# Patient Record
Sex: MALE | Race: WHITE | NOT HISPANIC OR LATINO | Employment: OTHER | URBAN - METROPOLITAN AREA
[De-identification: names, ages, dates, MRNs, and addresses within clinical notes are randomized per-mention and may not be internally consistent; named-entity substitution may affect disease eponyms.]

---

## 2023-10-27 DIAGNOSIS — I10 PRIMARY HYPERTENSION: ICD-10-CM

## 2023-10-27 RX ORDER — LOSARTAN POTASSIUM 100 MG/1
100 TABLET ORAL DAILY
COMMUNITY
Start: 2023-07-11 | End: 2023-10-30 | Stop reason: SDUPTHER

## 2023-10-30 RX ORDER — LOSARTAN POTASSIUM 100 MG/1
100 TABLET ORAL DAILY
Qty: 90 TABLET | Refills: 0 | Status: SHIPPED | OUTPATIENT
Start: 2023-10-30 | End: 2024-02-02

## 2023-11-05 PROBLEM — R00.1 SYMPTOMATIC SINUS BRADYCARDIA: Status: ACTIVE | Noted: 2023-11-05

## 2023-11-05 PROBLEM — I44.2 COMPLETE ATRIOVENTRICULAR BLOCK (MULTI): Status: ACTIVE | Noted: 2023-11-05

## 2023-11-05 PROBLEM — R91.1 LUNG NODULE: Status: ACTIVE | Noted: 2023-11-05

## 2023-11-05 PROBLEM — R03.0 ELEVATED BLOOD PRESSURE READING WITHOUT DIAGNOSIS OF HYPERTENSION: Status: ACTIVE | Noted: 2023-11-05

## 2023-11-05 PROBLEM — R00.2 PALPITATIONS: Status: ACTIVE | Noted: 2023-11-05

## 2023-11-05 PROBLEM — E78.00 HYPERCHOLESTEREMIA: Status: ACTIVE | Noted: 2019-05-14

## 2023-11-05 PROBLEM — F17.200 SMOKER: Status: ACTIVE | Noted: 2019-05-14

## 2023-11-05 PROBLEM — B18.8: Status: ACTIVE | Noted: 2021-08-04

## 2023-11-05 PROBLEM — U07.1 COVID-19: Status: ACTIVE | Noted: 2022-10-25

## 2023-11-05 PROBLEM — I71.40 AAA (ABDOMINAL AORTIC ANEURYSM) (CMS-HCC): Status: ACTIVE | Noted: 2023-11-05

## 2023-11-05 PROBLEM — I10 BENIGN ESSENTIAL HTN: Status: ACTIVE | Noted: 2022-06-24

## 2023-11-05 PROBLEM — F17.210 NICOTINE DEPENDENCE, CIGARETTES, UNCOMPLICATED: Status: ACTIVE | Noted: 2023-02-08

## 2023-11-05 PROBLEM — R51.9 HEADACHE: Status: ACTIVE | Noted: 2023-11-05

## 2023-11-05 PROBLEM — I10 HYPERTENSION: Status: ACTIVE | Noted: 2022-09-19

## 2023-11-05 PROBLEM — I25.10 MILD CAD: Status: ACTIVE | Noted: 2019-05-14

## 2023-11-05 PROBLEM — E78.2 MIXED HYPERLIPIDEMIA: Status: ACTIVE | Noted: 2021-08-04

## 2023-11-05 PROBLEM — R06.02 SHORTNESS OF BREATH: Status: ACTIVE | Noted: 2023-11-05

## 2023-11-05 PROBLEM — R73.01 ABNORMAL FASTING GLUCOSE: Status: ACTIVE | Noted: 2019-05-14

## 2023-11-05 PROBLEM — R09.89 CAROTID BRUIT: Status: ACTIVE | Noted: 2023-11-05

## 2023-11-05 RX ORDER — IBUPROFEN 200 MG
1 TABLET ORAL EVERY 24 HOURS
COMMUNITY
End: 2023-11-07 | Stop reason: ALTCHOICE

## 2023-11-05 RX ORDER — PRAVASTATIN SODIUM 40 MG/1
40 TABLET ORAL DAILY
COMMUNITY
End: 2023-11-07 | Stop reason: SDUPTHER

## 2023-11-05 RX ORDER — CHOLECALCIFEROL (VITAMIN D3) 25 MCG
1000 TABLET ORAL DAILY
COMMUNITY
End: 2023-11-07 | Stop reason: ALTCHOICE

## 2023-11-05 RX ORDER — LATANOPROST 50 UG/ML
1 SOLUTION/ DROPS OPHTHALMIC NIGHTLY
COMMUNITY

## 2023-11-05 RX ORDER — IBUPROFEN 400 MG/1
400 TABLET ORAL EVERY 6 HOURS PRN
COMMUNITY
End: 2023-11-07 | Stop reason: ALTCHOICE

## 2023-11-05 RX ORDER — ACETAMINOPHEN 325 MG/1
650 TABLET ORAL EVERY 6 HOURS PRN
COMMUNITY
End: 2023-11-07 | Stop reason: ALTCHOICE

## 2023-11-05 RX ORDER — TIMOLOL MALEATE 5 MG/ML
1 SOLUTION/ DROPS OPHTHALMIC DAILY
COMMUNITY

## 2023-11-05 RX ORDER — ASPIRIN 81 MG/1
81 TABLET ORAL DAILY
COMMUNITY

## 2023-11-07 ENCOUNTER — OFFICE VISIT (OUTPATIENT)
Dept: PRIMARY CARE | Facility: CLINIC | Age: 68
End: 2023-11-07
Payer: COMMERCIAL

## 2023-11-07 ENCOUNTER — TELEPHONE (OUTPATIENT)
Dept: PRIMARY CARE | Facility: CLINIC | Age: 68
End: 2023-11-07

## 2023-11-07 VITALS
WEIGHT: 169 LBS | TEMPERATURE: 97.3 F | DIASTOLIC BLOOD PRESSURE: 84 MMHG | BODY MASS INDEX: 26.53 KG/M2 | HEIGHT: 67 IN | OXYGEN SATURATION: 99 % | HEART RATE: 73 BPM | SYSTOLIC BLOOD PRESSURE: 136 MMHG

## 2023-11-07 DIAGNOSIS — Z00.00 WELL ADULT EXAM: ICD-10-CM

## 2023-11-07 DIAGNOSIS — I10 BENIGN ESSENTIAL HTN: Primary | ICD-10-CM

## 2023-11-07 DIAGNOSIS — E78.00 HYPERCHOLESTEREMIA: ICD-10-CM

## 2023-11-07 DIAGNOSIS — F17.210 NICOTINE DEPENDENCE, CIGARETTES, UNCOMPLICATED: ICD-10-CM

## 2023-11-07 DIAGNOSIS — E78.2 MIXED HYPERLIPIDEMIA: ICD-10-CM

## 2023-11-07 DIAGNOSIS — R01.1 SYSTOLIC MURMUR: ICD-10-CM

## 2023-11-07 DIAGNOSIS — R01.2 ABNORMAL CARDIAC SOUNDS: ICD-10-CM

## 2023-11-07 DIAGNOSIS — I10 HYPERTENSION, UNSPECIFIED TYPE: ICD-10-CM

## 2023-11-07 DIAGNOSIS — E78.5 HYPERLIPIDEMIA, UNSPECIFIED HYPERLIPIDEMIA TYPE: ICD-10-CM

## 2023-11-07 PROCEDURE — 3075F SYST BP GE 130 - 139MM HG: CPT

## 2023-11-07 PROCEDURE — 1160F RVW MEDS BY RX/DR IN RCRD: CPT

## 2023-11-07 PROCEDURE — 99214 OFFICE O/P EST MOD 30 MIN: CPT

## 2023-11-07 PROCEDURE — 1126F AMNT PAIN NOTED NONE PRSNT: CPT

## 2023-11-07 PROCEDURE — 1159F MED LIST DOCD IN RCRD: CPT

## 2023-11-07 PROCEDURE — 93000 ELECTROCARDIOGRAM COMPLETE: CPT

## 2023-11-07 PROCEDURE — 3079F DIAST BP 80-89 MM HG: CPT

## 2023-11-07 RX ORDER — PRAVASTATIN SODIUM 40 MG/1
40 TABLET ORAL DAILY
Qty: 90 TABLET | Refills: 0 | Status: SHIPPED | OUTPATIENT
Start: 2023-11-07 | End: 2024-03-11

## 2023-11-07 ASSESSMENT — ENCOUNTER SYMPTOMS
SHORTNESS OF BREATH: 0
ORTHOPNEA: 0
FATIGUE: 0
GASTROINTESTINAL NEGATIVE: 1
NECK PAIN: 0
APPETITE CHANGE: 0
PND: 0
BLURRED VISION: 0
COUGH: 0
ACTIVITY CHANGE: 0
HYPERTENSION: 1
DIZZINESS: 0
SWEATS: 0
HEADACHES: 0
PALPITATIONS: 0
LIGHT-HEADEDNESS: 0
EYES NEGATIVE: 1
CHEST TIGHTNESS: 0
UNEXPECTED WEIGHT CHANGE: 0

## 2023-11-07 ASSESSMENT — PATIENT HEALTH QUESTIONNAIRE - PHQ9
SUM OF ALL RESPONSES TO PHQ9 QUESTIONS 1 AND 2: 0
1. LITTLE INTEREST OR PLEASURE IN DOING THINGS: NOT AT ALL
2. FEELING DOWN, DEPRESSED OR HOPELESS: NOT AT ALL

## 2023-11-07 ASSESSMENT — VISUAL ACUITY: OU: 1

## 2023-11-07 ASSESSMENT — PAIN SCALES - GENERAL: PAINLEVEL: 0-NO PAIN

## 2023-11-07 NOTE — PROGRESS NOTES
"Subjective   Patient ID: Porfirio Menendez is a 68 y.o. male who presents for Hypertension.    Hypertension  This is a chronic problem. The problem is controlled. Pertinent negatives include no anxiety, blurred vision, chest pain, headaches, malaise/fatigue, neck pain, orthopnea, palpitations, peripheral edema, PND, shortness of breath or sweats. There are no associated agents to hypertension. Risk factors for coronary artery disease include male gender, smoking/tobacco exposure and dyslipidemia. Treatments tried: Currently taking losartan 100 mg daily. There are no compliance problems.  There is no history of angina, kidney disease, CAD/MI, CVA, heart failure, left ventricular hypertrophy, PVD or retinopathy.        Review of Systems   Constitutional:  Negative for activity change, appetite change, fatigue, malaise/fatigue and unexpected weight change.   HENT: Negative.     Eyes: Negative.  Negative for blurred vision.   Respiratory:  Negative for cough, chest tightness and shortness of breath.    Cardiovascular:  Negative for chest pain, palpitations, orthopnea and PND.   Gastrointestinal: Negative.    Musculoskeletal:  Negative for neck pain.   Skin: Negative.    Neurological:  Negative for dizziness, syncope, light-headedness and headaches.         Objective   Blood Pressure 136/84 (BP Location: Left arm)   Pulse 73   Temperature 36.3 °C (97.3 °F) (Temporal)   Height 1.702 m (5' 7\")   Weight 76.7 kg (169 lb)   Oxygen Saturation 99%   Body Mass Index 26.47 kg/m²     Physical Exam  Vitals and nursing note reviewed.   Constitutional:       General: He is not in acute distress.     Appearance: Normal appearance. He is not ill-appearing or toxic-appearing.   HENT:      Right Ear: Tympanic membrane, ear canal and external ear normal.      Left Ear: Tympanic membrane, ear canal and external ear normal.      Nose: Nose normal.      Mouth/Throat:      Mouth: Mucous membranes are moist.      Pharynx: Oropharynx is clear. "   Eyes:      General: Lids are normal. Vision grossly intact.      Extraocular Movements: Extraocular movements intact.      Conjunctiva/sclera: Conjunctivae normal.      Pupils: Pupils are equal, round, and reactive to light.      Comments: Wears glasses for vision, sees optometry regularly   Neck:      Thyroid: No thyromegaly or thyroid tenderness.      Vascular: No carotid bruit or JVD.      Trachea: Trachea and phonation normal.   Cardiovascular:      Rate and Rhythm: Normal rate and regular rhythm.      Pulses: Normal pulses.      Heart sounds: S1 normal and S2 normal. Murmur heard.      Systolic murmur is present with a grade of 3/6.      No S3 or S4 sounds.      Comments: Audible grade 3 systolic murmur heard best in the aortic area with radiation to right carotid.  Pulmonary:      Effort: Pulmonary effort is normal.      Breath sounds: Normal breath sounds and air entry. No decreased breath sounds, wheezing, rhonchi or rales.   Musculoskeletal:         General: Normal range of motion.      Cervical back: Normal range of motion and neck supple.      Right lower leg: No edema.      Left lower leg: No edema.   Lymphadenopathy:      Cervical: No cervical adenopathy.   Skin:     General: Skin is warm and dry.      Capillary Refill: Capillary refill takes less than 2 seconds.   Neurological:      General: No focal deficit present.      Mental Status: He is alert and oriented to person, place, and time.   Psychiatric:         Mood and Affect: Mood normal.         Behavior: Behavior normal. Behavior is cooperative.         Thought Content: Thought content normal.         Judgment: Judgment normal.         Assessment/Plan   Problem List Items Addressed This Visit             ICD-10-CM    Nicotine dependence, cigarettes, uncomplicated F17.210    Benign essential HTN - Primary I10     Other Visit Diagnoses       Diagnosis Codes    Hyperlipidemia, unspecified hyperlipidemia type     E78.5    Relevant Medications     pravastatin (Pravachol) 40 mg tablet    Systolic murmur     R01.1    Abnormal cardiac sounds     R01.2    Relevant Orders    ECG 12 lead (Clinic Performed) (Completed)    Referral to Cardiology        1. Benign essential HTN    Blood pressure well controlled. Refills sent to pharmacy.   Discussed medication desired effects, potential side effects, and how to administer the medication.   Continue current medication for hypertension. Continue to monitor blood pressure.  Call if blood pressure consistently >140/90.  Non pharmacological interventions such as low salt, cardiac diet discussed. Smoking cessation discussed.  Increase physical activity as able.  Stress reduction interventions discussed.  Discussed signs and symptoms of major cardiovascular event and need to present to the ED.   Reevaluate in 6 months.  Patient verbalizes understanding regarding plan of care and all questions answered.      2. Hyperlipidemia, unspecified hyperlipidemia type  Hyperlipidemia   - Lipid Panel:   Lab Results   Component Value Date    CHOL 154 01/21/2023    HDL 53 01/21/2023    LDLCALC 88 01/21/2023    TRIG 67 01/21/2023     - Continue with pravastatin 40 mg daily  -Continue with lifestyle interventions including healthy diet  including lean proteins, veggies, fruits, and whole grains.  Limit saturated fats, excess sodium, and processed foods.  Limit sugary drinks and sweets.  Moderate exercise at least 30 minutes per day, 5 days per week, which can help increase your HDL.  - Follow-up in February for repeat lipid panel    - pravastatin (Pravachol) 40 mg tablet; Take 1 tablet (40 mg) by mouth once daily.  Dispense: 90 tablet; Refill: 0    3. Systolic murmur  New problem.  Patient asymptomatic at this time.  EKG completed showing normal sinus rhythm with no Normal axis, No ST or T wave changes. Discussed referral to cardiology for further evaluation.  Referral arranged for Dr. Ricks.     4. Abnormal cardiac sounds  - ECG 12 lead  (Clinic Performed)  - Referral to Cardiology; Future    5. Nicotine dependence, cigarettes, uncomplicated  Chronic problem, patient smoke 1/2 PPD, not interested in smoking cessation at this time.  Discussed contribution of smoking to chronic health conditions.

## 2023-11-15 ENCOUNTER — OFFICE VISIT (OUTPATIENT)
Dept: CARDIOLOGY | Facility: CLINIC | Age: 68
End: 2023-11-15
Payer: COMMERCIAL

## 2023-11-15 VITALS
HEART RATE: 68 BPM | DIASTOLIC BLOOD PRESSURE: 65 MMHG | BODY MASS INDEX: 26.94 KG/M2 | OXYGEN SATURATION: 100 % | WEIGHT: 172 LBS | SYSTOLIC BLOOD PRESSURE: 122 MMHG

## 2023-11-15 DIAGNOSIS — I35.9 AORTIC VALVE DISEASE: ICD-10-CM

## 2023-11-15 DIAGNOSIS — R09.89 BILATERAL CAROTID BRUITS: ICD-10-CM

## 2023-11-15 DIAGNOSIS — R01.2 ABNORMAL CARDIAC SOUNDS: ICD-10-CM

## 2023-11-15 DIAGNOSIS — I25.10 MILD CAD: ICD-10-CM

## 2023-11-15 DIAGNOSIS — I10 PRIMARY HYPERTENSION: Primary | ICD-10-CM

## 2023-11-15 DIAGNOSIS — E78.00 HYPERCHOLESTEREMIA: ICD-10-CM

## 2023-11-15 PROCEDURE — 1159F MED LIST DOCD IN RCRD: CPT | Performed by: INTERNAL MEDICINE

## 2023-11-15 PROCEDURE — 1126F AMNT PAIN NOTED NONE PRSNT: CPT | Performed by: INTERNAL MEDICINE

## 2023-11-15 PROCEDURE — 3078F DIAST BP <80 MM HG: CPT | Performed by: INTERNAL MEDICINE

## 2023-11-15 PROCEDURE — 1160F RVW MEDS BY RX/DR IN RCRD: CPT | Performed by: INTERNAL MEDICINE

## 2023-11-15 PROCEDURE — 3074F SYST BP LT 130 MM HG: CPT | Performed by: INTERNAL MEDICINE

## 2023-11-15 PROCEDURE — 99204 OFFICE O/P NEW MOD 45 MIN: CPT | Performed by: INTERNAL MEDICINE

## 2023-11-15 PROCEDURE — 99214 OFFICE O/P EST MOD 30 MIN: CPT | Performed by: INTERNAL MEDICINE

## 2023-11-15 ASSESSMENT — ENCOUNTER SYMPTOMS
LOSS OF SENSATION IN FEET: 0
OCCASIONAL FEELINGS OF UNSTEADINESS: 0
MUSCULOSKELETAL NEGATIVE: 1
CONSTITUTIONAL NEGATIVE: 1
GASTROINTESTINAL NEGATIVE: 1
DEPRESSION: 0
ENDOCRINE NEGATIVE: 1
RESPIRATORY NEGATIVE: 1
NEUROLOGICAL NEGATIVE: 1

## 2023-11-15 ASSESSMENT — PATIENT HEALTH QUESTIONNAIRE - PHQ9
2. FEELING DOWN, DEPRESSED OR HOPELESS: NOT AT ALL
1. LITTLE INTEREST OR PLEASURE IN DOING THINGS: NOT AT ALL
SUM OF ALL RESPONSES TO PHQ9 QUESTIONS 1 AND 2: 0

## 2023-11-15 ASSESSMENT — PAIN SCALES - GENERAL: PAINLEVEL: 0-NO PAIN

## 2023-11-15 NOTE — ASSESSMENT & PLAN NOTE
He has multiple risk factors for coronary artery disease but no symptoms at this stage.  Reportedly had a stress test before his lung resection last year.  We will watch this for now.

## 2023-11-15 NOTE — ASSESSMENT & PLAN NOTE
This 68-year-old white male who does have heart murmur which sounds like aortic sclerosis.  He is not complaining symptoms of angina factors or congestive heart failure.  He is not getting dizzy or lightheaded.  Would like to get echocardiogram just to assess aortic valve.  He also has bilateral bruits in his neck and feel these needs to be evaluated we will repeat the duplex of his carotids.

## 2023-11-15 NOTE — PROGRESS NOTES
Subjective      Chief Complaint   Patient presents with    Heart Murmur          This 68-year-old white male correct evaluate because a murmur was heard.  He does have a history of hypertension hypercholesterolemia and is a smoker.  He is not complaining of chest pains or chest discomforts.  His legs not swelling up on him.  He is not complains of some PND or orthopnea.  He has never had a myocardial infarction.  There is a family history coronary artery disease.  He continues to smoke.  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  This is new from a year or so ago.  His legs are not swelling up on him.  The legs do not hurt when he walks he has never had a stroke.  He is known to have bruits in his carotids these were checked a year and a half ago with mild disease.  His EKG shows a normal sinus rhythm.He has never had rheumatic fever that he is aware of.           Review of Systems   Constitutional: Negative.   HENT: Negative.     Eyes:  Positive for visual disturbance.   Respiratory: Negative.     Endocrine: Negative.    Skin: Negative.    Musculoskeletal: Negative.    Gastrointestinal: Negative.    Genitourinary: Negative.    Neurological: Negative.    All other systems reviewed and are negative.       History reviewed. No pertinent surgical history.     Active Ambulatory Problems     Diagnosis Date Noted    Symptomatic sinus bradycardia 11/05/2023    Smoker 05/14/2019    Shortness of breath 11/05/2023    Palpitations 11/05/2023    Other chronic viral hepatitis (CMS/HCC) 08/04/2021    Nicotine dependence, cigarettes, uncomplicated 02/08/2023    Mixed hyperlipidemia 08/04/2021    Hypercholesteremia 05/14/2019    Mild CAD 05/14/2019    Lung nodule 11/05/2023    Hypertension 09/19/2022    Headache 11/05/2023    Elevated blood pressure reading without diagnosis of hypertension 11/05/2023    COVID-19 10/25/2022    Complete atrioventricular block  "(CMS/HCC) 11/05/2023    Carotid bruit 11/05/2023    Benign essential HTN 06/24/2022    Abnormal fasting glucose 05/14/2019    AAA (abdominal aortic aneurysm) (CMS/[facility]) 11/05/2023    Aortic valve disease 11/15/2023     Resolved Ambulatory Problems     Diagnosis Date Noted    No Resolved Ambulatory Problems     No Additional Past Medical History        Visit Vitals  /65   Pulse 68   Wt 78 kg (172 lb)   SpO2 100%   BMI 26.94 kg/m²   Smoking Status Every Day   BSA 1.92 m²        Objective     Constitutional:       Appearance: Healthy appearance.   Eyes:      Pupils: Pupils are equal, round, and reactive to light.   Neck:      Vascular: JVD normal.   Pulmonary:      Breath sounds: Normal breath sounds.   Cardiovascular:      PMI at left midclavicular line. Normal rate. Regular rhythm. Normal S1. Normal S2.       Murmurs: There is a grade 1/6 systolic murmur at the URSB.      No gallop.  No click. No rub.   Pulses:     Carotid: with bruit bilaterally.  Edema:     Peripheral edema absent.   Abdominal:      Palpations: Abdomen is soft.      Tenderness: There is no abdominal tenderness.   Musculoskeletal:      Extremities: No clubbing present.Skin:     General: Skin is warm and dry.   Neurological:      General: No focal deficit present.            Lab Review:   Lab Results   Component Value Date    WBC 10.8 01/21/2023    HGB 15.7 01/21/2023    HCT 47.0 01/21/2023    MCV 94.4 01/21/2023     01/21/2023         Lab Results   Component Value Date    CHOL 154 01/21/2023    CHOL 189 07/28/2021    CHOL 202 (H) 07/10/2020     Lab Results   Component Value Date    HDL 53 01/21/2023    HDL 45 07/28/2021    HDL 51 07/10/2020     Lab Results   Component Value Date    LDLCALC 88 01/21/2023    LDLCALC 119 07/28/2021    LDLCALC 130 07/10/2020     Lab Results   Component Value Date    TRIG 67 01/21/2023    TRIG 127 07/28/2021    TRIG 103 07/10/2020     No components found for: \"CHOLHDL\"     Assessment/Plan     Aortic valve " disease  This 68-year-old white male who does have heart murmur which sounds like aortic sclerosis.  He is not complaining symptoms of angina factors or congestive heart failure.  He is not getting dizzy or lightheaded.  Would like to get echocardiogram just to assess aortic valve.  He also has bilateral bruits in his neck and feel these needs to be evaluated we will repeat the duplex of his carotids.    Hypertension  His blood pressure is doing well we will continue same medication    Carotid bruit  We will repeat duplex scan of the carotids    Mild CAD  He has multiple risk factors for coronary artery disease but no symptoms at this stage.  Reportedly had a stress test before his lung resection last year.  We will watch this for now.

## 2023-12-06 ENCOUNTER — APPOINTMENT (OUTPATIENT)
Dept: CARDIOLOGY | Facility: CLINIC | Age: 68
End: 2023-12-06
Payer: COMMERCIAL

## 2023-12-06 ENCOUNTER — HOSPITAL ENCOUNTER (OUTPATIENT)
Dept: VASCULAR MEDICINE | Facility: CLINIC | Age: 68
Discharge: HOME | End: 2023-12-06
Payer: COMMERCIAL

## 2023-12-06 DIAGNOSIS — R09.89 BILATERAL CAROTID BRUITS: ICD-10-CM

## 2023-12-06 PROCEDURE — 93880 EXTRACRANIAL BILAT STUDY: CPT

## 2023-12-06 PROCEDURE — 93880 EXTRACRANIAL BILAT STUDY: CPT | Performed by: SURGERY

## 2023-12-14 ENCOUNTER — APPOINTMENT (OUTPATIENT)
Dept: CARDIOLOGY | Facility: CLINIC | Age: 68
End: 2023-12-14
Payer: COMMERCIAL

## 2023-12-27 ENCOUNTER — HOSPITAL ENCOUNTER (OUTPATIENT)
Dept: CARDIOLOGY | Facility: CLINIC | Age: 68
Discharge: HOME | End: 2023-12-27
Payer: COMMERCIAL

## 2023-12-27 DIAGNOSIS — R01.2 ABNORMAL CARDIAC SOUNDS: ICD-10-CM

## 2023-12-27 LAB
AORTIC VALVE MEAN GRADIENT: 11.5
AORTIC VALVE PEAK VELOCITY: 2.22
AV PEAK GRADIENT: 19.7
AVA (PEAK VEL): 1.39
AVA (VTI): 1.17
EJECTION FRACTION APICAL 4 CHAMBER: 72.4
EJECTION FRACTION: 72
LEFT ATRIUM VOLUME AREA LENGTH INDEX BSA: 23.3
LEFT VENTRICULAR OUTFLOW TRACT DIAMETER: 1.96
MITRAL VALVE E/A RATIO: 0.77
MITRAL VALVE E/E' RATIO: 14.16
RIGHT VENTRICLE FREE WALL PEAK S': 13
TRICUSPID ANNULAR PLANE SYSTOLIC EXCURSION: 1.8

## 2023-12-27 PROCEDURE — 93306 TTE W/DOPPLER COMPLETE: CPT

## 2023-12-27 PROCEDURE — 93306 TTE W/DOPPLER COMPLETE: CPT | Performed by: INTERNAL MEDICINE

## 2024-01-03 ENCOUNTER — OFFICE VISIT (OUTPATIENT)
Dept: CARDIOLOGY | Facility: CLINIC | Age: 69
End: 2024-01-03
Payer: COMMERCIAL

## 2024-01-03 VITALS
DIASTOLIC BLOOD PRESSURE: 78 MMHG | BODY MASS INDEX: 26.63 KG/M2 | HEART RATE: 104 BPM | SYSTOLIC BLOOD PRESSURE: 122 MMHG | WEIGHT: 170 LBS | OXYGEN SATURATION: 94 %

## 2024-01-03 DIAGNOSIS — I35.9 AORTIC VALVE DISEASE: Primary | ICD-10-CM

## 2024-01-03 PROCEDURE — 3078F DIAST BP <80 MM HG: CPT | Performed by: INTERNAL MEDICINE

## 2024-01-03 PROCEDURE — 1126F AMNT PAIN NOTED NONE PRSNT: CPT | Performed by: INTERNAL MEDICINE

## 2024-01-03 PROCEDURE — 3074F SYST BP LT 130 MM HG: CPT | Performed by: INTERNAL MEDICINE

## 2024-01-03 PROCEDURE — 1159F MED LIST DOCD IN RCRD: CPT | Performed by: INTERNAL MEDICINE

## 2024-01-03 ASSESSMENT — PATIENT HEALTH QUESTIONNAIRE - PHQ9
SUM OF ALL RESPONSES TO PHQ9 QUESTIONS 1 AND 2: 0
2. FEELING DOWN, DEPRESSED OR HOPELESS: NOT AT ALL
1. LITTLE INTEREST OR PLEASURE IN DOING THINGS: NOT AT ALL

## 2024-01-03 ASSESSMENT — ENCOUNTER SYMPTOMS
DEPRESSION: 0
LOSS OF SENSATION IN FEET: 0
OCCASIONAL FEELINGS OF UNSTEADINESS: 0

## 2024-01-03 ASSESSMENT — PAIN SCALES - GENERAL: PAINLEVEL: 0-NO PAIN

## 2024-01-03 NOTE — PROGRESS NOTES
Subjective      Chief Complaint   Patient presents with    Results          He had the echocardiogram which shows that he has a gradient across aortic valve peak of 19 and mean of 11 mmHg.  Is felt he has mild aortic stenosis. He had duplex of the carotids and felt to have mild atherosclerosis.           ROS     History reviewed. No pertinent surgical history.     Active Ambulatory Problems     Diagnosis Date Noted    Symptomatic sinus bradycardia 11/05/2023    Smoker 05/14/2019    Shortness of breath 11/05/2023    Palpitations 11/05/2023    Other chronic viral hepatitis (CMS/[facility]) 08/04/2021    Nicotine dependence, cigarettes, uncomplicated 02/08/2023    Mixed hyperlipidemia 08/04/2021    Hypercholesteremia 05/14/2019    Mild CAD 05/14/2019    Lung nodule 11/05/2023    Hypertension 09/19/2022    Headache 11/05/2023    Elevated blood pressure reading without diagnosis of hypertension 11/05/2023    COVID-19 10/25/2022    Complete atrioventricular block (CMS/[facility]) 11/05/2023    Carotid bruit 11/05/2023    Benign essential HTN 06/24/2022    Abnormal fasting glucose 05/14/2019    AAA (abdominal aortic aneurysm) (CMS/[facility]) 11/05/2023    Aortic valve disease 11/15/2023     Resolved Ambulatory Problems     Diagnosis Date Noted    No Resolved Ambulatory Problems     No Additional Past Medical History        Visit Vitals  /78   Pulse 104   Wt 77.1 kg (170 lb)   SpO2 94%   BMI 26.63 kg/m²   Smoking Status Every Day   BSA 1.91 m²        Objective     Physical Exam       Lab Review:         Lab Results   Component Value Date    CHOL 154 01/21/2023    CHOL 189 07/28/2021    CHOL 202 (H) 07/10/2020     Lab Results   Component Value Date    HDL 53 01/21/2023    HDL 45 07/28/2021    HDL 51 07/10/2020     Lab Results   Component Value Date    LDLCALC 88 01/21/2023    LDLCALC 119 07/28/2021    LDLCALC 130 07/10/2020     Lab Results   Component Value Date    TRIG 67 01/21/2023    TRIG 127 07/28/2021    TRIG 103 07/10/2020     No  "components found for: \"CHOLHDL\"     Assessment/Plan     Aortic valve disease  Echo shows the valve is sclerotic and will watch.  See in a year.     "

## 2024-01-10 ENCOUNTER — HOSPITAL ENCOUNTER (OUTPATIENT)
Dept: VASCULAR MEDICINE | Facility: CLINIC | Age: 69
Discharge: HOME | End: 2024-01-10
Payer: COMMERCIAL

## 2024-01-10 DIAGNOSIS — I71.40: ICD-10-CM

## 2024-01-10 PROCEDURE — 93978 VASCULAR STUDY: CPT

## 2024-01-10 PROCEDURE — 93978 VASCULAR STUDY: CPT | Performed by: SURGERY

## 2024-01-12 ENCOUNTER — APPOINTMENT (OUTPATIENT)
Dept: VASCULAR SURGERY | Facility: HOSPITAL | Age: 69
End: 2024-01-12
Payer: COMMERCIAL

## 2024-01-19 ENCOUNTER — APPOINTMENT (OUTPATIENT)
Dept: VASCULAR SURGERY | Facility: HOSPITAL | Age: 69
End: 2024-01-19
Payer: COMMERCIAL

## 2024-02-02 ENCOUNTER — TELEMEDICINE (OUTPATIENT)
Dept: VASCULAR SURGERY | Facility: HOSPITAL | Age: 69
End: 2024-02-02
Payer: COMMERCIAL

## 2024-02-02 DIAGNOSIS — I10 PRIMARY HYPERTENSION: ICD-10-CM

## 2024-02-02 DIAGNOSIS — I71.40 ABDOMINAL AORTIC ANEURYSM (AAA) WITHOUT RUPTURE, UNSPECIFIED PART (CMS-HCC): Primary | ICD-10-CM

## 2024-02-02 PROCEDURE — 99214 OFFICE O/P EST MOD 30 MIN: CPT | Performed by: NURSE PRACTITIONER

## 2024-02-02 PROCEDURE — 99406 BEHAV CHNG SMOKING 3-10 MIN: CPT | Performed by: NURSE PRACTITIONER

## 2024-02-02 RX ORDER — LOSARTAN POTASSIUM 100 MG/1
100 TABLET ORAL DAILY
Qty: 90 TABLET | Refills: 0 | Status: SHIPPED | OUTPATIENT
Start: 2024-02-02 | End: 2024-05-15

## 2024-02-03 NOTE — PROGRESS NOTES
Vascular Surgery Clinic Note    CC: [facility] telehealth AAA     History Of Present Illness:   Porfirio Menendez is a 68 y.o. male here for routine follow up of his abdominal aortic aneurysm that is being monitored for growth. He denies abdominal or back pain that may be attributed to his aneurysm. His blood pressure is well controlled. He continues to smoke but is planning on quitting for Lent.     Abdominal duplex shows a 6 mm growth of his aneurysm in 1 year. It currently measures 4.7 cm.     Medical History:  Patient Active Problem List   Diagnosis    Symptomatic sinus bradycardia    Smoker    Shortness of breath    Palpitations    Other chronic viral hepatitis (CMS/HCC)    Nicotine dependence, cigarettes, uncomplicated    Mixed hyperlipidemia    Hypercholesteremia    Mild CAD    Lung nodule    Hypertension    Headache    Elevated blood pressure reading without diagnosis of hypertension    COVID-19    Complete atrioventricular block (CMS/HCC)    Carotid bruit    Benign essential HTN    Abnormal fasting glucose    AAA (abdominal aortic aneurysm) (CMS/HCC)    Aortic valve disease        SH:    Social Determinants of Health     Tobacco Use: High Risk (1/3/2024)    Patient History     Smoking Tobacco Use: Every Day     Smokeless Tobacco Use: Never     Passive Exposure: Not on file   Alcohol Use: Not on file   Financial Resource Strain: Not on file   Food Insecurity: Not on file   Transportation Needs: Not on file   Physical Activity: Not on file   Stress: Not on file   Social Connections: Not on file   Intimate Partner Violence: Not on file   Depression: Not at risk (1/3/2024)    PHQ-2     PHQ-2 Score: 0   Housing Stability: Not on file   Utilities: Not on file   Digital Equity: Not on file        FH:  Family History   Problem Relation Name Age of Onset    Heart attack Mother      Other (issues with heart) Father          Allergies:   No Known Allergies    ROS:  All systems were reviewed and noted to be negative, other than  described above.     Objective:  Last Recorded Vitals  There were no vitals taken for this visit.    Meds:   Current Outpatient Medications   Medication Instructions    aspirin 81 mg, oral, Daily, Pt stated occ he forgets     latanoprost (Xalatan) 0.005 % ophthalmic solution 1 drop, Both Eyes, Nightly    losartan (COZAAR) 100 mg, oral, Daily    pravastatin (PRAVACHOL) 40 mg, oral, Daily    timolol (Timoptic) 0.5 % ophthalmic solution 1 drop, Both Eyes, Daily, prn       Exam:  Unable to obtain - virtual visit     Imaging Reviewed:  Vascular US aorta iliac duplex complete 01/10/2024    [place] [id number]  Tel [phone] and Fax [phone]      Vascular Lab Report  [facility] US AORTA ILIAC DUPLEX COMPLETE      Patient Name:      STEPHANIE Torres Physician: [id number] Arsalan Valadez MD  Study Date:        1/10/2024           Ordering           [id number] ROBLES LAM  Physician:  MRN/PID:           [id number]            Technologist:      Beth Barker  T  Accession#:        [id number]        Technologist 2:  Date of Birth/Age: 1955 / 68      Encounter#:        [id number]  years  Gender:            M  Admission Status:  Outpatient          Location           [facility]  Performed:      Diagnosis/ICD: Abdominal aortic aneurysm, without rupture, unspecified-I71.40  CPT Codes:     25600 Duplex Aorta/IVC/Iliac/Bypass Graft      Patient History F/U for known AAA.      CONCLUSIONS:  Aorta/Common Iliac Arteries/IVC: Positive for abdominal aortic fusiform aneurysm at mid to mid/distal abdominal aortic level. Increased velocities noted in bilateral common iliac arteries.    Comparison:  Compared with study from 12/15/2022, Abdominal aortic aneurysm has slightly increased from 3.69cm x 4.11cm to now 3.82cm x 4.74cm.    Imaging & Doppler Findings:    AORTA     AP    Lateral    PSV  Proximal 1.24 cm         80.0 cm/s  Mid    3.82 cm 4.74 cm 89.0 cm/s  Distal   1.75 cm 1.94 cm 27.0 cm/s    RIGHT       AP        PSV  SUYAPA Proximal 0.80 cm 197.00 cm/s    LEFT       AP        PSV  SUYAPA Proximal 0.88 cm 242.00 cm/s      [id number] Arsalan Valadez MD  Electronically signed by [id number] Arsalan Valadez MD on 1/10/2024 at 10:49:31 AM        ** Final **        Assessment & Plan:  1. Abdominal aortic aneurysm (AAA) without rupture, unspecified part (CMS/HCC)  Vascular US aorta iliac duplex complete        AAA measuring 3.8 x 4.7 cm (previously 3.7 x 4.1 cm last year).   Blood pressure control  Smoking cessation  Return in 6 months with abdominal duplex     Follow-up:  6 months     Ready to quit: Yes  Counseling given: Yes    Salome Triana, JEREMÍAS-CNP     Ivermectin Counseling:  Patient instructed to take medication on an empty stomach with a full glass of water.  Patient informed of potential adverse effects including but not limited to nausea, diarrhea, dizziness, itching, and swelling of the extremities or lymph nodes.  The patient verbalized understanding of the proper use and possible adverse effects of ivermectin.  All of the patient's questions and concerns were addressed.

## 2024-02-09 ENCOUNTER — OFFICE VISIT (OUTPATIENT)
Dept: PRIMARY CARE | Facility: CLINIC | Age: 69
End: 2024-02-09
Payer: COMMERCIAL

## 2024-02-09 VITALS
OXYGEN SATURATION: 99 % | HEART RATE: 81 BPM | WEIGHT: 169 LBS | DIASTOLIC BLOOD PRESSURE: 76 MMHG | TEMPERATURE: 98.3 F | BODY MASS INDEX: 26.53 KG/M2 | HEIGHT: 67 IN | SYSTOLIC BLOOD PRESSURE: 152 MMHG

## 2024-02-09 DIAGNOSIS — F17.210 NICOTINE DEPENDENCE, CIGARETTES, UNCOMPLICATED: ICD-10-CM

## 2024-02-09 DIAGNOSIS — I71.40 ABDOMINAL AORTIC ANEURYSM (AAA) WITHOUT RUPTURE, UNSPECIFIED PART (CMS-HCC): ICD-10-CM

## 2024-02-09 DIAGNOSIS — E78.2 MIXED HYPERLIPIDEMIA: ICD-10-CM

## 2024-02-09 DIAGNOSIS — Z00.00 ROUTINE GENERAL MEDICAL EXAMINATION AT HEALTH CARE FACILITY: Primary | ICD-10-CM

## 2024-02-09 DIAGNOSIS — I10 PRIMARY HYPERTENSION: ICD-10-CM

## 2024-02-09 DIAGNOSIS — Z12.2 SCREENING FOR LUNG CANCER: ICD-10-CM

## 2024-02-09 PROBLEM — I44.2 COMPLETE ATRIOVENTRICULAR BLOCK (MULTI): Status: RESOLVED | Noted: 2023-11-05 | Resolved: 2024-02-09

## 2024-02-09 PROBLEM — B18.8: Status: RESOLVED | Noted: 2021-08-04 | Resolved: 2024-02-09

## 2024-02-09 PROCEDURE — 1159F MED LIST DOCD IN RCRD: CPT

## 2024-02-09 PROCEDURE — 1123F ACP DISCUSS/DSCN MKR DOCD: CPT

## 2024-02-09 PROCEDURE — 3078F DIAST BP <80 MM HG: CPT

## 2024-02-09 PROCEDURE — 1170F FXNL STATUS ASSESSED: CPT

## 2024-02-09 PROCEDURE — 1160F RVW MEDS BY RX/DR IN RCRD: CPT

## 2024-02-09 PROCEDURE — 1158F ADVNC CARE PLAN TLK DOCD: CPT

## 2024-02-09 PROCEDURE — G0439 PPPS, SUBSEQ VISIT: HCPCS

## 2024-02-09 PROCEDURE — 3077F SYST BP >= 140 MM HG: CPT

## 2024-02-09 PROCEDURE — 4004F PT TOBACCO SCREEN RCVD TLK: CPT

## 2024-02-09 PROCEDURE — 1126F AMNT PAIN NOTED NONE PRSNT: CPT

## 2024-02-09 ASSESSMENT — PATIENT HEALTH QUESTIONNAIRE - PHQ9
2. FEELING DOWN, DEPRESSED OR HOPELESS: NOT AT ALL
SUM OF ALL RESPONSES TO PHQ9 QUESTIONS 1 AND 2: 0
1. LITTLE INTEREST OR PLEASURE IN DOING THINGS: NOT AT ALL

## 2024-02-09 ASSESSMENT — ACTIVITIES OF DAILY LIVING (ADL)
DRESSING: INDEPENDENT
MANAGING_FINANCES: INDEPENDENT
BATHING: INDEPENDENT
GROCERY_SHOPPING: INDEPENDENT
TAKING_MEDICATION: INDEPENDENT
DOING_HOUSEWORK: INDEPENDENT

## 2024-02-09 ASSESSMENT — PAIN SCALES - GENERAL: PAINLEVEL: 0-NO PAIN

## 2024-02-09 ASSESSMENT — VISUAL ACUITY: OU: 1

## 2024-02-09 NOTE — PROGRESS NOTES
Subjective   Reason for Visit: Porfirio Menendez is an 68 y.o. male here for a Medicare Wellness visit.     Past Medical, Surgical, and Family History reviewed and updated in chart.         HPI    Porfirio Menendez presents for annual physical exam. No new concerns at this visit.  No recent hospitalizations or illness.     Patient's recent visit notes, medication and allergy lists, past medical surgical social hx, immunization, vitals, problem list, recent tests were reviewed by me for pertinence to this visit.      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, follows closely with the [facility], every 6 months      Social Hx:  , still working full time  Smoking: Yes - 1/2  PPD, decreasing to 1/4 PPF on 2/14, then quit  by 4/15/2024  Alcohol: Yes - socially  Recreational drug use: No      Screening tools:  EKG: Yes - sees cardiology  Lung screening: Smoker x 50 yrs- 1/2 PPD to 1PPD  Colonoscopy: Yes - UTD 2020  PSA: Yes - last completed 1/21/2023 1.5       Vaccinations:  Tdap:  will consider for next visit  Flu Vaccine: declines  Shingles:  declines    Prevnar 20: 2023  Pneumovax: yes 2021      Patient Care Team:  JEREMÍAS Damico-CNP as PCP - General (Family Medicine)     Review of Systems   Constitutional:  Negative for activity change, appetite change, chills, fatigue, fever and unexpected weight change.   HENT:  Negative for dental problem, ear pain, hearing loss, mouth sores, nosebleeds, sinus pressure, sore throat, tinnitus and trouble swallowing.    Eyes:  Negative for photophobia, pain, redness, itching and visual disturbance.   Respiratory:  Negative for cough, chest tightness, shortness of  "breath and wheezing.    Cardiovascular:  Negative for chest pain, palpitations and leg swelling.   Gastrointestinal:  Negative for abdominal pain, anal bleeding, blood in stool, constipation, diarrhea, nausea, rectal pain and vomiting.   Endocrine: Negative for cold intolerance, heat intolerance, polydipsia, polyphagia and polyuria.   Genitourinary:  Negative for decreased urine volume, dysuria, frequency, hematuria, penile discharge, scrotal swelling, testicular pain and urgency.   Musculoskeletal:  Negative for arthralgias, back pain, gait problem, joint swelling, myalgias and neck pain.   Skin:  Negative for color change, rash and wound.   Neurological:  Negative for dizziness, tremors, speech difficulty, weakness, light-headedness and headaches.   Hematological:  Negative for adenopathy. Does not bruise/bleed easily.   Psychiatric/Behavioral:  Negative for behavioral problems, decreased concentration, sleep disturbance and suicidal ideas. The patient is not nervous/anxious and is not hyperactive.        Objective   Vitals:  /76 (BP Location: Left arm)   Pulse 81   Temp 36.8 °C (98.3 °F) (Temporal)   Ht 1.702 m (5' 7\")   Wt 76.7 kg (169 lb)   SpO2 99%   BMI 26.47 kg/m²       Physical Exam  Vitals and nursing note reviewed.   Constitutional:       General: He is not in acute distress.     Appearance: Normal appearance. He is well-developed and well-groomed.   HENT:      Head: Normocephalic.      Right Ear: Hearing, tympanic membrane, ear canal and external ear normal.      Left Ear: Hearing, tympanic membrane, ear canal and external ear normal.      Nose: Nose normal.      Mouth/Throat:      Lips: Pink.      Mouth: Mucous membranes are moist.      Pharynx: Oropharynx is clear. Uvula midline.      Comments: Full upper and lower dentures  Eyes:      General: Lids are normal. Vision grossly intact. Gaze aligned appropriately.      Extraocular Movements: Extraocular movements intact.      " Conjunctiva/sclera: Conjunctivae normal.      Pupils: Pupils are equal, round, and reactive to light.      Comments: Wears glasses   Neck:      Thyroid: No thyroid mass or thyromegaly.      Vascular: Carotid bruit present. No JVD.      Trachea: Trachea and phonation normal.   Cardiovascular:      Rate and Rhythm: Normal rate and regular rhythm.      Pulses: Normal pulses.      Heart sounds: S1 normal and S2 normal. Murmur heard.      Systolic murmur is present with a grade of 3/6.   Pulmonary:      Effort: Pulmonary effort is normal. No respiratory distress.      Breath sounds: Normal breath sounds and air entry.   Abdominal:      General: Bowel sounds are normal. There is no distension or abdominal bruit.      Palpations: Abdomen is soft. There is no hepatomegaly, splenomegaly, mass or pulsatile mass.      Tenderness: There is no abdominal tenderness. There is no right CVA tenderness, left CVA tenderness or guarding.   Musculoskeletal:         General: Normal range of motion.      Cervical back: Normal, full passive range of motion without pain, normal range of motion and neck supple.      Thoracic back: Normal.      Lumbar back: Normal.      Right lower leg: No edema.      Left lower leg: No edema.   Lymphadenopathy:      Cervical: No cervical adenopathy.   Skin:     General: Skin is warm and dry.      Capillary Refill: Capillary refill takes less than 2 seconds.   Neurological:      General: No focal deficit present.      Mental Status: He is alert and oriented to person, place, and time.      Cranial Nerves: Cranial nerves 2-12 are intact.      Sensory: Sensation is intact.      Motor: Motor function is intact.      Coordination: Coordination is intact.      Gait: Gait is intact.   Psychiatric:         Attention and Perception: Attention and perception normal.         Mood and Affect: Mood and affect normal.         Speech: Speech normal.         Behavior: Behavior normal. Behavior is cooperative.          Thought Content: Thought content normal.         Cognition and Memory: Cognition normal.         Judgment: Judgment normal.         Assessment/Plan   Problem List Items Addressed This Visit       Nicotine dependence, cigarettes, uncomplicated  I spent more than 3 minutes (but less than 10 minutes) counseling patient about the need for smoking/tobacco cessation and how I can support efforts when patient is ready to quit.  Discussed nicotinic replacement therapy, Bupropion, Varenicline, hypnosis, support groups, and acupuncture as potential options.  Patient reports a titration plan for quitting by his birthday 4/15/2025.   Will obtain Low dose lung CT as discussed.     Relevant Orders    CT lung screening low dose    Mixed hyperlipidemia  - Continue with pravastatin 40 mg daily.  -Continue with lifestyle interventions including healthy diet  including lean proteins, veggies, fruits, and whole grains.  Limit saturated fats, excess sodium, and processed foods.  Limit sugary drinks and sweets.  Moderate exercise at least 30 minutes per day, 5 days per week, which can help increase your HDL.  - Obtain lipid panel as discussed for review      Hypertension   Chronic condition, needs tighter BP control. Discussed lifestyle intervention for tighter control.   Continue current medication for hypertension. Continue to monitor blood pressure.  Call if blood pressure consistently >140/90.  Non pharmacological interventions such as low salt, cardiac diet discussed.  Increase physical activity as able.  Stress reduction interventions discussed.  Discussed signs and symptoms of major cardiovascular event and need to present to the ED.   Reevaluate in 6 months.  Patient verbalizes understanding regarding plan of care and all questions answered.      AAA (abdominal aortic aneurysm) (CMS/[facility])  Continue to follow closely with vascular for AAA as discussed.       Other Visit Diagnoses       Routine general medical examination at [facility]  care facility    -  Primary  Well adult exam.  1. Age appropriate preventative measures reviewed.   2. Encouraged healthy diet and exercise.  3. Immunizations- Reviewed with patient  4. Labs- ordered but not completed for appointment, patient reminded to obtain  5. Medications- Reviewed    *Follow-up in 1 year for repeat annual physical exam. Patient verbalizes understanding  regarding plan of care and all questions answered.      Screening for lung cancer        Relevant Orders    CT lung screening low dose

## 2024-02-11 ASSESSMENT — ENCOUNTER SYMPTOMS
EYE PAIN: 0
NECK PAIN: 0
CHILLS: 0
MYALGIAS: 0
DIZZINESS: 0
WHEEZING: 0
POLYPHAGIA: 0
COUGH: 0
APPETITE CHANGE: 0
ACTIVITY CHANGE: 0
SPEECH DIFFICULTY: 0
HEADACHES: 0
ARTHRALGIAS: 0
BRUISES/BLEEDS EASILY: 0
TREMORS: 0
LIGHT-HEADEDNESS: 0
TROUBLE SWALLOWING: 0
FATIGUE: 0
NERVOUS/ANXIOUS: 0
WOUND: 0
NAUSEA: 0
WEAKNESS: 0
SORE THROAT: 0
ABDOMINAL PAIN: 0
EYE ITCHING: 0
SINUS PRESSURE: 0
BACK PAIN: 0
HYPERACTIVE: 0
VOMITING: 0
POLYDIPSIA: 0
CONSTIPATION: 0
RECTAL PAIN: 0
UNEXPECTED WEIGHT CHANGE: 0
JOINT SWELLING: 0
FEVER: 0
CHEST TIGHTNESS: 0
ADENOPATHY: 0
SHORTNESS OF BREATH: 0
PHOTOPHOBIA: 0
DECREASED CONCENTRATION: 0
ANAL BLEEDING: 0
DIARRHEA: 0
SLEEP DISTURBANCE: 0
FREQUENCY: 0
DYSURIA: 0
EYE REDNESS: 0
COLOR CHANGE: 0
BLOOD IN STOOL: 0
HEMATURIA: 0
PALPITATIONS: 0

## 2024-03-09 DIAGNOSIS — E78.5 HYPERLIPIDEMIA, UNSPECIFIED HYPERLIPIDEMIA TYPE: ICD-10-CM

## 2024-03-11 RX ORDER — PRAVASTATIN SODIUM 40 MG/1
40 TABLET ORAL DAILY
Qty: 90 TABLET | Refills: 1 | Status: SHIPPED | OUTPATIENT
Start: 2024-03-11

## 2024-05-14 DIAGNOSIS — E78.2 MIXED HYPERLIPIDEMIA: ICD-10-CM

## 2024-05-14 DIAGNOSIS — I10 PRIMARY HYPERTENSION: ICD-10-CM

## 2024-05-15 RX ORDER — LOSARTAN POTASSIUM 100 MG/1
100 TABLET ORAL DAILY
Qty: 90 TABLET | Refills: 0 | Status: SHIPPED | OUTPATIENT
Start: 2024-05-15

## 2024-08-02 ENCOUNTER — APPOINTMENT (OUTPATIENT)
Dept: VASCULAR MEDICINE | Facility: CLINIC | Age: 69
End: 2024-08-02
Payer: COMMERCIAL

## 2024-08-09 ENCOUNTER — APPOINTMENT (OUTPATIENT)
Dept: PRIMARY CARE | Facility: CLINIC | Age: 69
End: 2024-08-09
Payer: COMMERCIAL

## 2024-08-09 ENCOUNTER — TELEPHONE (OUTPATIENT)
Dept: PRIMARY CARE | Facility: CLINIC | Age: 69
End: 2024-08-09

## 2024-08-09 ENCOUNTER — HOSPITAL ENCOUNTER (OUTPATIENT)
Dept: VASCULAR MEDICINE | Facility: CLINIC | Age: 69
Discharge: HOME | End: 2024-08-09
Payer: COMMERCIAL

## 2024-08-09 VITALS
DIASTOLIC BLOOD PRESSURE: 82 MMHG | BODY MASS INDEX: 26.16 KG/M2 | OXYGEN SATURATION: 98 % | WEIGHT: 167 LBS | SYSTOLIC BLOOD PRESSURE: 132 MMHG | HEART RATE: 74 BPM

## 2024-08-09 DIAGNOSIS — E78.2 MIXED HYPERLIPIDEMIA: ICD-10-CM

## 2024-08-09 DIAGNOSIS — Z12.5 SCREENING FOR PROSTATE CANCER: ICD-10-CM

## 2024-08-09 DIAGNOSIS — F17.210 NICOTINE DEPENDENCE, CIGARETTES, UNCOMPLICATED: ICD-10-CM

## 2024-08-09 DIAGNOSIS — I71.40 ABDOMINAL AORTIC ANEURYSM (AAA) WITHOUT RUPTURE, UNSPECIFIED PART (CMS-HCC): ICD-10-CM

## 2024-08-09 DIAGNOSIS — I10 PRIMARY HYPERTENSION: Primary | ICD-10-CM

## 2024-08-09 DIAGNOSIS — I10 PRIMARY HYPERTENSION: ICD-10-CM

## 2024-08-09 DIAGNOSIS — R09.89 BILATERAL CAROTID BRUITS: ICD-10-CM

## 2024-08-09 PROCEDURE — 4004F PT TOBACCO SCREEN RCVD TLK: CPT

## 2024-08-09 PROCEDURE — 3075F SYST BP GE 130 - 139MM HG: CPT

## 2024-08-09 PROCEDURE — 1160F RVW MEDS BY RX/DR IN RCRD: CPT

## 2024-08-09 PROCEDURE — 1159F MED LIST DOCD IN RCRD: CPT

## 2024-08-09 PROCEDURE — 93978 VASCULAR STUDY: CPT

## 2024-08-09 PROCEDURE — 1126F AMNT PAIN NOTED NONE PRSNT: CPT

## 2024-08-09 PROCEDURE — 93978 VASCULAR STUDY: CPT | Performed by: INTERNAL MEDICINE

## 2024-08-09 PROCEDURE — 99214 OFFICE O/P EST MOD 30 MIN: CPT

## 2024-08-09 PROCEDURE — 1158F ADVNC CARE PLAN TLK DOCD: CPT

## 2024-08-09 PROCEDURE — 1123F ACP DISCUSS/DSCN MKR DOCD: CPT

## 2024-08-09 PROCEDURE — 3079F DIAST BP 80-89 MM HG: CPT

## 2024-08-09 ASSESSMENT — PAIN SCALES - GENERAL: PAINLEVEL: 0-NO PAIN

## 2024-08-09 NOTE — ASSESSMENT & PLAN NOTE
Discussed smoking cessation.  No ready to quit.  He was ordered annual CT lung screening at last visit, has not yet obtained.  Reminded to schedule, he is agreeable.

## 2024-08-09 NOTE — ASSESSMENT & PLAN NOTE
Chronic condition, stable at this visit  Continue losartan 100 mg daily as prescribed.  Monitor home blood pressures.  Call if blood pressure consistently >140/90.  Non pharmacological interventions such as lowering salt, saturated fats, cholesterol, and sugar diet discussed.  Increase physical activity, aim for 30 minutes 5 days per week as able.  Stress reduction interventions discussed.  Discussed signs and symptoms of major cardiovascular event and need to present to the ED.   Reevaluate in 6 months.

## 2024-08-09 NOTE — PROGRESS NOTES
Subjective     Patient ID: Porfirio Menendez is a 69 y.o. male who presents for Follow-up.      HPI    Porfirio presents for chronic condition follow up.     Essential hypertension:   Taking losartan 100 mg daily   Tolerating medications well.  Denies change in vision, headache, or dizziness.   No complaints of chest pain, palpitations, or shortness of breath.    Hyperlipidemia Review:  Lipid panel: ordered prior to visit but not obtained  Medications: pravastatin 40 mg daily   ASCVD risk: 28.3%  Also taking daily ASA 81 mg for prevention due to high ASCVD risk.       Patient's recent visit notes, medication and allergy lists, past medical surgical social hx, immunization, vitals, problem list, recent tests were reviewed by me for pertinence to this visit.    Current Outpatient Medications:     aspirin 81 mg EC tablet, Take 1 tablet (81 mg) by mouth once daily. Pt stated occ he forgets, Disp: , Rfl:     latanoprost (Xalatan) 0.005 % ophthalmic solution, Administer 1 drop into both eyes once daily at bedtime., Disp: , Rfl:     losartan (Cozaar) 100 mg tablet, TAKE 1 TABLET BY MOUTH DAILY, Disp: 90 tablet, Rfl: 0    pravastatin (Pravachol) 40 mg tablet, Take 1 tablet (40 mg) by mouth once daily., Disp: 90 tablet, Rfl: 1    timolol (Timoptic) 0.5 % ophthalmic solution, Administer 1 drop into both eyes once daily. prn, Disp: , Rfl:       Review of Systems  All other systems have been reviewed and are negative except as noted in the HPI.         Objective   /82 (BP Location: Left arm)   Pulse 74   Wt 75.8 kg (167 lb)   SpO2 98%   BMI 26.16 kg/m²       Physical Exam  Vitals and nursing note reviewed.   Constitutional:       General: He is not in acute distress.     Appearance: Normal appearance. He is not ill-appearing.   Neck:      Thyroid: No thyromegaly or thyroid tenderness.      Vascular: Carotid bruit (bilateral) present. No JVD.      Trachea: Trachea and phonation normal.   Cardiovascular:      Rate and Rhythm:  Normal rate and regular rhythm.      Pulses: Normal pulses.      Heart sounds: S1 normal and S2 normal. Murmur heard.      Systolic murmur is present with a grade of 2/6.   Pulmonary:      Effort: Pulmonary effort is normal. No respiratory distress.      Breath sounds: Normal breath sounds and air entry.   Musculoskeletal:      Cervical back: Normal range of motion and neck supple. No rigidity or tenderness.      Right lower leg: No edema.      Left lower leg: No edema.   Lymphadenopathy:      Cervical: No cervical adenopathy.   Skin:     General: Skin is warm and dry.      Capillary Refill: Capillary refill takes less than 2 seconds.   Neurological:      General: No focal deficit present.      Mental Status: He is alert. Mental status is at baseline.   Psychiatric:         Mood and Affect: Mood normal.         Behavior: Behavior normal. Behavior is cooperative.         Thought Content: Thought content normal.         Judgment: Judgment normal.           Assessment & Plan  Primary hypertension    Chronic condition, stable at this visit  Continue losartan 100 mg daily as prescribed.  Monitor home blood pressures.  Call if blood pressure consistently >140/90.  Non pharmacological interventions such as lowering salt, saturated fats, cholesterol, and sugar diet discussed.  Increase physical activity, aim for 30 minutes 5 days per week as able.  Stress reduction interventions discussed.  Discussed signs and symptoms of major cardiovascular event and need to present to the ED.   Reevaluate in 6 months.         Mixed hyperlipidemia  Chronic condition, stable with medication  Continue pravastatin 40 mg daily  Labs ordered but not obtained prior to visit, states he will obtain with the next few days  Reports any new onset of muscle pain or weakness.  Continue with health diet low in saturated fats, cholesterol, sodium, and limit sugars.  Exercise 30-45 minutes 5 days per week.  Follow up in 6 months.          Bilateral  carotid bruits  Chronic condition, due for follow up ultrasound in November.   Orders:    Vascular US carotid artery duplex bilateral; Future    Nicotine dependence, cigarettes, uncomplicated  Discussed smoking cessation.  No ready to quit.  He was ordered annual CT lung screening at last visit, has not yet obtained.  Reminded to schedule, he is agreeable.              Patient understands and agrees with treatment plan.    Chayito Tan, APRN-CNP

## 2024-08-09 NOTE — ASSESSMENT & PLAN NOTE
Chronic condition, stable with medication  Continue pravastatin 40 mg daily  Labs ordered but not obtained prior to visit, states he will obtain with the next few days  Reports any new onset of muscle pain or weakness.  Continue with health diet low in saturated fats, cholesterol, sodium, and limit sugars.  Exercise 30-45 minutes 5 days per week.  Follow up in 6 months.

## 2024-08-09 NOTE — ASSESSMENT & PLAN NOTE
Chronic condition, due for follow up ultrasound in November.   Orders:    Vascular US carotid artery duplex bilateral; Future

## 2024-08-21 DIAGNOSIS — I10 PRIMARY HYPERTENSION: ICD-10-CM

## 2024-08-21 RX ORDER — LOSARTAN POTASSIUM 100 MG/1
100 TABLET ORAL DAILY
Qty: 30 TABLET | Refills: 0 | Status: SHIPPED | OUTPATIENT
Start: 2024-08-21

## 2024-09-19 ENCOUNTER — LAB (OUTPATIENT)
Dept: LAB | Facility: LAB | Age: 69
End: 2024-09-19
Payer: COMMERCIAL

## 2024-09-19 ENCOUNTER — HOSPITAL ENCOUNTER (OUTPATIENT)
Dept: RADIOLOGY | Facility: HOSPITAL | Age: 69
Discharge: HOME | End: 2024-09-19
Payer: COMMERCIAL

## 2024-09-19 DIAGNOSIS — E78.2 MIXED HYPERLIPIDEMIA: ICD-10-CM

## 2024-09-19 DIAGNOSIS — Z12.2 SCREENING FOR LUNG CANCER: ICD-10-CM

## 2024-09-19 DIAGNOSIS — I10 PRIMARY HYPERTENSION: ICD-10-CM

## 2024-09-19 DIAGNOSIS — F17.210 NICOTINE DEPENDENCE, CIGARETTES, UNCOMPLICATED: ICD-10-CM

## 2024-09-19 LAB
ALBUMIN SERPL BCP-MCNC: 4.5 G/DL (ref 3.4–5)
ALP SERPL-CCNC: 115 U/L (ref 33–136)
ALT SERPL W P-5'-P-CCNC: 17 U/L (ref 10–52)
ANION GAP SERPL CALCULATED.3IONS-SCNC: 12 MMOL/L (ref 10–20)
APPEARANCE UR: ABNORMAL
AST SERPL W P-5'-P-CCNC: 18 U/L (ref 9–39)
BASOPHILS # BLD AUTO: 0.12 X10*3/UL (ref 0–0.1)
BASOPHILS NFR BLD AUTO: 1.1 %
BILIRUB SERPL-MCNC: 0.6 MG/DL (ref 0–1.2)
BILIRUB UR STRIP.AUTO-MCNC: NEGATIVE MG/DL
BUN SERPL-MCNC: 16 MG/DL (ref 6–23)
CALCIUM SERPL-MCNC: 9.7 MG/DL (ref 8.6–10.3)
CHLORIDE SERPL-SCNC: 103 MMOL/L (ref 98–107)
CHOLEST SERPL-MCNC: 138 MG/DL (ref 0–199)
CHOLEST/HDLC SERPL: 2.9 {RATIO}
CO2 SERPL-SCNC: 29 MMOL/L (ref 21–32)
COLOR UR: ABNORMAL
CREAT SERPL-MCNC: 1.2 MG/DL (ref 0.5–1.3)
CREAT UR-MCNC: 37.9 MG/DL (ref 20–370)
EGFRCR SERPLBLD CKD-EPI 2021: 65 ML/MIN/1.73M*2
EOSINOPHIL # BLD AUTO: 0.1 X10*3/UL (ref 0–0.7)
EOSINOPHIL NFR BLD AUTO: 0.9 %
ERYTHROCYTE [DISTWIDTH] IN BLOOD BY AUTOMATED COUNT: 13.9 % (ref 11.5–14.5)
GLUCOSE SERPL-MCNC: 87 MG/DL (ref 74–99)
GLUCOSE UR STRIP.AUTO-MCNC: NORMAL MG/DL
HCT VFR BLD AUTO: 46.3 % (ref 41–52)
HDLC SERPL-MCNC: 48 MG/DL
HGB BLD-MCNC: 15.5 G/DL (ref 13.5–17.5)
IMM GRANULOCYTES # BLD AUTO: 0.05 X10*3/UL (ref 0–0.7)
IMM GRANULOCYTES NFR BLD AUTO: 0.4 % (ref 0–0.9)
KETONES UR STRIP.AUTO-MCNC: NEGATIVE MG/DL
LDLC SERPL CALC-MCNC: 69 MG/DL
LEUKOCYTE ESTERASE UR QL STRIP.AUTO: NEGATIVE
LYMPHOCYTES # BLD AUTO: 2.39 X10*3/UL (ref 1.2–4.8)
LYMPHOCYTES NFR BLD AUTO: 21.4 %
MCH RBC QN AUTO: 31.4 PG (ref 26–34)
MCHC RBC AUTO-ENTMCNC: 33.5 G/DL (ref 32–36)
MCV RBC AUTO: 94 FL (ref 80–100)
MICROALBUMIN UR-MCNC: 157.2 MG/L
MICROALBUMIN/CREAT UR: 414.8 UG/MG CREAT
MONOCYTES # BLD AUTO: 0.5 X10*3/UL (ref 0.1–1)
MONOCYTES NFR BLD AUTO: 4.5 %
NEUTROPHILS # BLD AUTO: 8 X10*3/UL (ref 1.2–7.7)
NEUTROPHILS NFR BLD AUTO: 71.7 %
NITRITE UR QL STRIP.AUTO: NEGATIVE
NON HDL CHOLESTEROL: 90 MG/DL (ref 0–149)
NRBC BLD-RTO: 0 /100 WBCS (ref 0–0)
PH UR STRIP.AUTO: 7 [PH]
PLATELET # BLD AUTO: 228 X10*3/UL (ref 150–450)
POTASSIUM SERPL-SCNC: 4.9 MMOL/L (ref 3.5–5.3)
PROT SERPL-MCNC: 7.3 G/DL (ref 6.4–8.2)
PROT UR STRIP.AUTO-MCNC: ABNORMAL MG/DL
RBC # BLD AUTO: 4.94 X10*6/UL (ref 4.5–5.9)
RBC # UR STRIP.AUTO: NEGATIVE /UL
RBC #/AREA URNS AUTO: ABNORMAL /HPF
SODIUM SERPL-SCNC: 139 MMOL/L (ref 136–145)
SP GR UR STRIP.AUTO: 1.01
TRIGL SERPL-MCNC: 107 MG/DL (ref 0–149)
UROBILINOGEN UR STRIP.AUTO-MCNC: NORMAL MG/DL
VLDL: 21 MG/DL (ref 0–40)
WBC # BLD AUTO: 11.2 X10*3/UL (ref 4.4–11.3)
WBC #/AREA URNS AUTO: ABNORMAL /HPF

## 2024-09-19 PROCEDURE — 71271 CT THORAX LUNG CANCER SCR C-: CPT

## 2024-09-19 PROCEDURE — 80053 COMPREHEN METABOLIC PANEL: CPT

## 2024-09-19 PROCEDURE — 80061 LIPID PANEL: CPT

## 2024-09-19 PROCEDURE — 36415 COLL VENOUS BLD VENIPUNCTURE: CPT

## 2024-09-19 PROCEDURE — 81001 URINALYSIS AUTO W/SCOPE: CPT

## 2024-09-19 PROCEDURE — 82570 ASSAY OF URINE CREATININE: CPT

## 2024-09-19 PROCEDURE — 82043 UR ALBUMIN QUANTITATIVE: CPT

## 2024-09-19 PROCEDURE — 85025 COMPLETE CBC W/AUTO DIFF WBC: CPT

## 2024-09-24 DIAGNOSIS — E78.5 HYPERLIPIDEMIA, UNSPECIFIED HYPERLIPIDEMIA TYPE: ICD-10-CM

## 2024-09-24 DIAGNOSIS — I10 PRIMARY HYPERTENSION: ICD-10-CM

## 2024-09-24 RX ORDER — PRAVASTATIN SODIUM 40 MG/1
40 TABLET ORAL DAILY
Qty: 90 TABLET | Refills: 0 | Status: SHIPPED | OUTPATIENT
Start: 2024-09-24 | End: 2024-09-24

## 2024-09-24 RX ORDER — LOSARTAN POTASSIUM 100 MG/1
100 TABLET ORAL DAILY
Qty: 90 TABLET | Refills: 1 | Status: SHIPPED | OUTPATIENT
Start: 2024-09-24

## 2024-09-24 RX ORDER — LOSARTAN POTASSIUM 100 MG/1
100 TABLET ORAL DAILY
Qty: 30 TABLET | Refills: 0 | Status: SHIPPED | OUTPATIENT
Start: 2024-09-24 | End: 2024-09-24

## 2024-09-24 RX ORDER — PRAVASTATIN SODIUM 40 MG/1
40 TABLET ORAL DAILY
Qty: 90 TABLET | Refills: 1 | Status: SHIPPED | OUTPATIENT
Start: 2024-09-24

## 2024-09-26 DIAGNOSIS — R91.1 LUNG NODULE: Primary | ICD-10-CM

## 2024-12-06 ENCOUNTER — TELEMEDICINE (OUTPATIENT)
Dept: VASCULAR SURGERY | Facility: HOSPITAL | Age: 69
End: 2024-12-06
Payer: COMMERCIAL

## 2024-12-06 DIAGNOSIS — I71.40 ABDOMINAL AORTIC ANEURYSM (AAA) WITHOUT RUPTURE, UNSPECIFIED PART (CMS-HCC): Primary | ICD-10-CM

## 2024-12-06 PROCEDURE — 99213 OFFICE O/P EST LOW 20 MIN: CPT | Performed by: NURSE PRACTITIONER

## 2024-12-06 PROCEDURE — 1123F ACP DISCUSS/DSCN MKR DOCD: CPT | Performed by: NURSE PRACTITIONER

## 2024-12-09 ENCOUNTER — TELEPHONE (OUTPATIENT)
Dept: PRIMARY CARE | Facility: CLINIC | Age: 69
End: 2024-12-09
Payer: COMMERCIAL

## 2024-12-09 NOTE — TELEPHONE ENCOUNTER
Patient is having a new grandchild and it's recommended that he have the TDAP Vaccine. Is it OK to Schedule?    Camille Patient's Spouse can be reached at [phone]

## 2024-12-11 NOTE — TELEPHONE ENCOUNTER
Called and explained that since Patient has Medicare he's unable to get vaccine in the office. Patient was told that he could go to a Pharmacy to obtain vaccine.

## 2024-12-23 NOTE — PROGRESS NOTES
Vascular Surgery Clinic Note    CC: Follow-up visit abdominal aortic aneurysm    History Of Present Illness:   Porfirio Menendez is a 69 y.o. male being seen as telephone visit for routine follow-up of his abdominal aortic aneurysm that is being monitored for growth.  He denies abdominal or back pain that may be attributed to his aneurysm.    Medical History:  Patient Active Problem List   Diagnosis    Symptomatic sinus bradycardia    Smoker    Shortness of breath    Palpitations    Nicotine dependence, cigarettes, uncomplicated    Mixed hyperlipidemia    Hypercholesteremia    Mild CAD    Lung nodule    Hypertension    Headache    Elevated blood pressure reading without diagnosis of hypertension    COVID-19    Carotid bruit    Benign essential HTN    Abnormal fasting glucose    AAA (abdominal aortic aneurysm) (CMS-[facility])    Aortic valve disease        SH:    Social Drivers of Health     Tobacco Use: High Risk (11/26/2024)    Received from [facility]    Patient History     Smoking Tobacco Use: Every Day     Smokeless Tobacco Use: Never     Passive Exposure: Not on file   Alcohol Use: Not on file   Financial Resource Strain: Not on file   Food Insecurity: Not on file   Transportation Needs: Not on file   Physical Activity: Not on file   Stress: Not on file   Social Connections: Not on file   Intimate Partner Violence: Not on file   Depression: Not at risk (8/9/2024)    PHQ-2     PHQ-2 Score: 0   Housing Stability: Not on file   Utilities: Not on file   Digital Equity: Not on file   Health Literacy: Not on file        FH:  Family History   Problem Relation Name Age of Onset    Heart attack Mother      Other (issues with heart) Father          Allergies:   No Known Allergies    ROS:  All systems were reviewed and noted to be negative, other than described above.     Objective:  Last Recorded Vitals  There were no vitals taken for this visit.    Meds:   Current Outpatient Medications   Medication Instructions    aspirin 81  mg, oral, Daily, Pt stated occ he forgets     latanoprost (Xalatan) 0.005 % ophthalmic solution 1 drop, Both Eyes, Nightly    losartan (COZAAR) 100 mg, oral, Daily    pravastatin (PRAVACHOL) 40 mg, oral, Daily    timolol (Timoptic) 0.5 % ophthalmic solution 1 drop, Both Eyes, Daily, prn       Exam:  Unable to obtain, virtual visit    Imaging Reviewed:  Vascular US aorta iliac duplex complete 08/09/2024    [place] [id number]  Tel [phone] and Fax [phone]      Vascular Lab Report  [facility] US AORTA ILIAC DUPLEX COMPLETE      Patient Name:      STEPHANIE FAY          Reading Physician:  [id number] Aldo Lowry MD, JESU  Study Date:        8/9/2024             Ordering Physician: [id number] ROBLES LAM  MRN/PID:           [id number]             Technologist:       Boni Craven RVT  Accession#:        [id number]         Technologist 2:  Date of Birth/Age: 1955 / 69 years Encounter#:         [id number]  Gender:            M  Admission Status:  Outpatient           Location Performed: [facility]      Diagnosis/ICD: Abdominal aortic aneurysm, without rupture, unspecified-I71.40  CPT Codes:     83965 Duplex Aorta/IVC/Iliac/Bypass Graft      Patient History H/o AAA.      CONCLUSIONS:  Aorta/Common Iliac Arteries/IVC: Positive for abdominal aortic fusiform aneurysm at mid to mid/distal abdominal aortic level. Bilateral common iliac arteries demonstrate no evidence of aneurysm.    Comparison:  Compared with study from 1/10/2024, abdominal aortic aneurysm has slightly increased from 3.82 cm x 4.74 cm to now 3.85 cm x 4.79 cm.    Imaging & Doppler Findings:    AORTA     AP    Lateral    PSV  Proximal 2.00 cm         88.6 cm/s  Mid    3.85 cm 4.79 cm 99.2 cm/s  Distal  1.70 cm 1.86 cm 46.0 cm/s    RIGHT       AP        PSV  SUYAPA Proximal 1.15 cm 139.00 cm/s    LEFT       AP        PSV  SUYAPA Proximal 0.94 cm 137.00 cm/s      [id number] Aldo Lowry MD, RPVI  Electronically  signed by [id number] Aldo Lowry MD, RPVI on 8/9/2024 at 8:49:23 AM        ** Final **      Assessment & Plan:  1. Abdominal aortic aneurysm (AAA) without rupture, unspecified part (CMS-HCC)  Vascular US aorta iliac duplex complete        Abdominal aortic aneurysm measuring 3.9 x 4.8 cm (8/2024), previously 3.8 x 4.7 cm (1/2024).   Blood pressure control  Continue aspirin and statin  Obtain updated aortic duplex  Discussed stAAAble trial, he will consider it    Salome Triana, APRN-CNP

## 2025-02-10 ENCOUNTER — TELEPHONE (OUTPATIENT)
Dept: PRIMARY CARE | Facility: CLINIC | Age: 70
End: 2025-02-10

## 2025-02-10 ENCOUNTER — APPOINTMENT (OUTPATIENT)
Dept: PRIMARY CARE | Facility: CLINIC | Age: 70
End: 2025-02-10
Payer: COMMERCIAL

## 2025-02-10 VITALS
WEIGHT: 172 LBS | BODY MASS INDEX: 27 KG/M2 | DIASTOLIC BLOOD PRESSURE: 80 MMHG | OXYGEN SATURATION: 98 % | SYSTOLIC BLOOD PRESSURE: 138 MMHG | HEIGHT: 67 IN | TEMPERATURE: 97.2 F | HEART RATE: 82 BPM

## 2025-02-10 DIAGNOSIS — E78.2 MIXED HYPERLIPIDEMIA: ICD-10-CM

## 2025-02-10 DIAGNOSIS — I10 PRIMARY HYPERTENSION: ICD-10-CM

## 2025-02-10 DIAGNOSIS — Z12.5 PROSTATE CANCER SCREENING: ICD-10-CM

## 2025-02-10 DIAGNOSIS — Z00.00 ANNUAL PHYSICAL EXAM: Primary | ICD-10-CM

## 2025-02-10 PROBLEM — H44.2A1: Status: RESOLVED | Noted: 2017-10-19 | Resolved: 2025-02-10

## 2025-02-10 PROBLEM — R73.01 IMPAIRED FASTING GLUCOSE: Status: RESOLVED | Noted: 2020-07-10 | Resolved: 2025-02-10

## 2025-02-10 PROBLEM — H40.003 GLAUCOMA SUSPECT, BILATERAL: Status: RESOLVED | Noted: 2023-06-01 | Resolved: 2025-02-10

## 2025-02-10 PROBLEM — H31.003: Status: RESOLVED | Noted: 2017-10-19 | Resolved: 2025-02-10

## 2025-02-10 PROBLEM — H44.20 DEGENERATIVE MYOPIA: Status: RESOLVED | Noted: 2023-06-01 | Resolved: 2025-02-10

## 2025-02-10 PROBLEM — H33.321 RETINAL HOLE, RIGHT: Status: RESOLVED | Noted: 2017-10-19 | Resolved: 2025-02-10

## 2025-02-10 PROCEDURE — 3078F DIAST BP <80 MM HG: CPT

## 2025-02-10 PROCEDURE — 1159F MED LIST DOCD IN RCRD: CPT

## 2025-02-10 PROCEDURE — 99397 PER PM REEVAL EST PAT 65+ YR: CPT

## 2025-02-10 PROCEDURE — 3075F SYST BP GE 130 - 139MM HG: CPT

## 2025-02-10 PROCEDURE — 99213 OFFICE O/P EST LOW 20 MIN: CPT

## 2025-02-10 PROCEDURE — 1170F FXNL STATUS ASSESSED: CPT

## 2025-02-10 PROCEDURE — 1160F RVW MEDS BY RX/DR IN RCRD: CPT

## 2025-02-10 PROCEDURE — 1123F ACP DISCUSS/DSCN MKR DOCD: CPT

## 2025-02-10 PROCEDURE — 3008F BODY MASS INDEX DOCD: CPT

## 2025-02-10 RX ORDER — PRAVASTATIN SODIUM 40 MG/1
40 TABLET ORAL DAILY
Qty: 90 TABLET | Refills: 1 | Status: SHIPPED | OUTPATIENT
Start: 2025-02-10

## 2025-02-10 RX ORDER — LOSARTAN POTASSIUM 100 MG/1
100 TABLET ORAL DAILY
Qty: 90 TABLET | Refills: 1 | Status: SHIPPED | OUTPATIENT
Start: 2025-02-10

## 2025-02-10 ASSESSMENT — ACTIVITIES OF DAILY LIVING (ADL)
TAKING_MEDICATION: INDEPENDENT
BATHING: INDEPENDENT
DOING_HOUSEWORK: INDEPENDENT
MANAGING_FINANCES: INDEPENDENT
GROCERY_SHOPPING: INDEPENDENT
DRESSING: INDEPENDENT

## 2025-02-10 NOTE — PROGRESS NOTES
Subjective   Patient ID: Porfirio Menendez is a 69 y.o. male who presents for Annual Exam.    HPI     Porfirio Menendez presents for annual physical exam.     Concerns today for right shoulder pain.  He first noticed a few months ago.  Realized that it could have been the way he is holding his arm to control his mouse/computer.  He has changed his computer set up in his office.  He is experiencing some improvement since making that change.     Patient's recent visit notes, medication and allergy lists, past medical surgical social hx, immunization, vitals, problem list, recent tests were reviewed by me for pertinence to this visit.      PMH:   HTN- taking losartan 100 mg by mouth daily, denies any adverse effects  Systolic murmer  Hyperlipidemia- continues pravastatin 40 mg dailyas prescribed  AAA- recent follow up with vascular, US every 6 months  CAD- He will be seeing Dr. 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, follows closely with the [facility], every 6 months      Social Hx:  , still working full time  , 4 adult children, 1 new grandchild  Smoking: Yes - 1/3 PPD  Alcohol: Yes - socially  Recreational drug use: No        Screening tools:  EKG: Yes - sees cardiology  Lung screening: Smoker x 50 yrs- 1/2 PPD to 1PPD  Colonoscopy: Yes - UTD 2020  PSA: Yes - last completed 1/21/2023 1.5        Vaccinations:  Tdap: updated 2024  Flu Vaccine: decline  Shingles: decline  Prevnar 20: 2023  Pneumovax: 2021    Review of Systems  GENERAL - Denies fever/chills, recent illness, unexplained weight loss  HEENT- Denies change in vision, double vision, blurred. Denies hearing changes, ear pain. Denies nose bleeds. Denies sore throat, difficulty swallowing.    RESP - Denies SOB or cough  CVS - Denies CP, palpitations  GI - Denies nausea or abdominal pain, hematochezia/melena  - Denies urinary  "frequency, urgency or incontinence.  Denies nocturia.   NEURO - Denies headache, dizziness  MSK - Denies neck or back pain. Right shoulder pain  Skin - Denies abnormal lesions, rash  PSYCH-Denies anxiety, depression, changes in mood      Objective   /76 (BP Location: Left arm, Patient Position: Sitting, BP Cuff Size: Adult)   Pulse 82   Temp 36.2 °C (97.2 °F) (Temporal)   Ht 1.702 m (5' 7\")   Wt 78 kg (172 lb)   SpO2 98%   BMI 26.94 kg/m²     Physical Exam  Vitals and nursing note reviewed.   Constitutional:       General: He is not in acute distress.     Appearance: Normal appearance. He is well-developed and well-groomed.   HENT:      Head: Normocephalic.      Right Ear: Hearing, tympanic membrane, ear canal and external ear normal.      Left Ear: Hearing, tympanic membrane, ear canal and external ear normal.      Nose: Nose normal.      Mouth/Throat:      Lips: Pink.      Mouth: Mucous membranes are moist.      Pharynx: Oropharynx is clear. Uvula midline.   Eyes:      General: Lids are normal. Vision grossly intact. Gaze aligned appropriately.      Extraocular Movements: Extraocular movements intact.      Conjunctiva/sclera: Conjunctivae normal.      Pupils: Pupils are equal, round, and reactive to light.   Neck:      Thyroid: No thyroid mass or thyromegaly.      Vascular: No carotid bruit or JVD.      Trachea: Trachea and phonation normal.   Cardiovascular:      Rate and Rhythm: Normal rate and regular rhythm.      Pulses: Normal pulses.      Heart sounds: Normal heart sounds, S1 normal and S2 normal.   Pulmonary:      Effort: Pulmonary effort is normal. No respiratory distress.      Breath sounds: Normal breath sounds and air entry.   Abdominal:      General: Bowel sounds are normal. There is no distension or abdominal bruit.      Palpations: Abdomen is soft. There is no hepatomegaly, splenomegaly, mass or pulsatile mass.      Tenderness: There is no abdominal tenderness. There is no right CVA " tenderness, left CVA tenderness or guarding.   Musculoskeletal:         General: Normal range of motion.      Cervical back: Normal, full passive range of motion without pain, normal range of motion and neck supple.      Thoracic back: Normal.      Lumbar back: Normal.      Right lower leg: No edema.      Left lower leg: No edema.   Lymphadenopathy:      Cervical: No cervical adenopathy.   Skin:     General: Skin is warm and dry.      Capillary Refill: Capillary refill takes less than 2 seconds.   Neurological:      General: No focal deficit present.      Mental Status: He is alert and oriented to person, place, and time.      Cranial Nerves: Cranial nerves 2-12 are intact.      Sensory: Sensation is intact.      Motor: Motor function is intact.      Coordination: Coordination is intact.      Gait: Gait is intact.   Psychiatric:         Attention and Perception: Attention and perception normal.         Mood and Affect: Mood and affect normal.         Speech: Speech normal.         Behavior: Behavior normal. Behavior is cooperative.         Thought Content: Thought content normal.         Cognition and Memory: Cognition normal.         Judgment: Judgment normal.           Assessment & Plan  Annual physical exam  Well adult exam.  1. Age appropriate preventative measures reviewed.   2. Encouraged healthy diet and exercise.  3. Immunizations- Reviewed  4. Labs-ordered prior to visit but not yet obtained  5. Medications- Reviewed    *Follow-up in 1 year for repeat annual physical exam. Patient verbalizes understanding  regarding plan of care and all questions answered.    Orders:    CBC and Auto Differential; Future    Comprehensive metabolic panel; Future    Lipid Panel; Future    PSA; Future    Primary hypertension  Chronic condition, stable at this visit  Continue losartan 100 mg daily as prescribed.  Monitor home blood pressures.  Call if blood pressure consistently >140/90.  Non pharmacological interventions such  as lowering salt, saturated fats, cholesterol, and sugar diet discussed.  Increase physical activity, aim for 30 minutes 5 days per week as able.  Stress reduction interventions discussed.  Discussed signs and symptoms of major cardiovascular event and need to present to the ED.   Reevaluate in 6 months.    Orders:    CBC and Auto Differential; Future    Comprehensive metabolic panel; Future    Albumin-Creatinine Ratio, Urine Random; Future    losartan (Cozaar) 100 mg tablet; Take 1 tablet (100 mg) by mouth once daily.    Mixed hyperlipidemia  Chronic condition, stable with medication  Continue pravastatin 40 mg daily  Labs ordered, will review upon result  Reports any new onset of muscle pain or weakness.  Continue with health diet low in saturated fats, cholesterol, sodium, and limit sugars.  Exercise 30-45 minutes 5 days per week.  Follow up in 6 months.     Orders:    Lipid Panel; Future    pravastatin (Pravachol) 40 mg tablet; Take 1 tablet (40 mg) by mouth once daily.    Prostate cancer screening    Orders:    PSA; Future

## 2025-02-10 NOTE — ASSESSMENT & PLAN NOTE
Chronic condition, stable with medication  Continue pravastatin 40 mg daily  Labs ordered, will review upon result  Reports any new onset of muscle pain or weakness.  Continue with health diet low in saturated fats, cholesterol, sodium, and limit sugars.  Exercise 30-45 minutes 5 days per week.  Follow up in 6 months.     Orders:    Lipid Panel; Future    pravastatin (Pravachol) 40 mg tablet; Take 1 tablet (40 mg) by mouth once daily.

## 2025-02-10 NOTE — PATIENT INSTRUCTIONS
Obtain lab work as we discussed.     Schedule your carotid artery ultrasound screening.    Schedule your CT Lung screening.

## 2025-02-10 NOTE — ASSESSMENT & PLAN NOTE
Chronic condition, stable at this visit  Continue losartan 100 mg daily as prescribed.  Monitor home blood pressures.  Call if blood pressure consistently >140/90.  Non pharmacological interventions such as lowering salt, saturated fats, cholesterol, and sugar diet discussed.  Increase physical activity, aim for 30 minutes 5 days per week as able.  Stress reduction interventions discussed.  Discussed signs and symptoms of major cardiovascular event and need to present to the ED.   Reevaluate in 6 months.    Orders:    CBC and Auto Differential; Future    Comprehensive metabolic panel; Future    Albumin-Creatinine Ratio, Urine Random; Future    losartan (Cozaar) 100 mg tablet; Take 1 tablet (100 mg) by mouth once daily.

## 2025-02-16 ASSESSMENT — VISUAL ACUITY: OU: 1

## 2025-02-17 ENCOUNTER — APPOINTMENT (OUTPATIENT)
Dept: RADIOLOGY | Facility: CLINIC | Age: 70
End: 2025-02-17
Payer: COMMERCIAL

## 2025-02-19 ENCOUNTER — ANCILLARY PROCEDURE (OUTPATIENT)
Dept: VASCULAR MEDICINE | Facility: CLINIC | Age: 70
End: 2025-02-19
Payer: COMMERCIAL

## 2025-02-19 DIAGNOSIS — I71.40 ABDOMINAL AORTIC ANEURYSM (AAA) WITHOUT RUPTURE, UNSPECIFIED PART (CMS-HCC): ICD-10-CM

## 2025-02-19 PROCEDURE — 93978 VASCULAR STUDY: CPT | Performed by: SURGERY

## 2025-02-19 PROCEDURE — 93978 VASCULAR STUDY: CPT

## 2025-03-26 ENCOUNTER — ANCILLARY PROCEDURE (OUTPATIENT)
Dept: VASCULAR MEDICINE | Facility: CLINIC | Age: 70
End: 2025-03-26
Payer: COMMERCIAL

## 2025-03-26 ENCOUNTER — HOSPITAL ENCOUNTER (OUTPATIENT)
Dept: RADIOLOGY | Facility: HOSPITAL | Age: 70
Discharge: HOME | End: 2025-03-26
Payer: COMMERCIAL

## 2025-03-26 DIAGNOSIS — R91.1 LUNG NODULE: ICD-10-CM

## 2025-03-26 DIAGNOSIS — R09.89 BILATERAL CAROTID BRUITS: ICD-10-CM

## 2025-03-26 PROCEDURE — 93880 EXTRACRANIAL BILAT STUDY: CPT

## 2025-03-26 PROCEDURE — 76380 CAT SCAN FOLLOW-UP STUDY: CPT | Performed by: RADIOLOGY

## 2025-03-26 PROCEDURE — 71250 CT THORAX DX C-: CPT

## 2025-03-26 PROCEDURE — 93880 EXTRACRANIAL BILAT STUDY: CPT | Performed by: STUDENT IN AN ORGANIZED HEALTH CARE EDUCATION/TRAINING PROGRAM

## 2025-03-28 LAB
ALBUMIN SERPL-MCNC: 4.7 G/DL (ref 3.6–5.1)
ALBUMIN/CREAT UR: 61 MG/G CREAT
ALP SERPL-CCNC: 125 U/L (ref 35–144)
ALT SERPL-CCNC: 15 U/L (ref 9–46)
ANION GAP SERPL CALCULATED.4IONS-SCNC: 9 MMOL/L (CALC) (ref 7–17)
AST SERPL-CCNC: 15 U/L (ref 10–35)
BASOPHILS # BLD AUTO: 119 CELLS/UL (ref 0–200)
BASOPHILS NFR BLD AUTO: 1 %
BILIRUB SERPL-MCNC: 0.6 MG/DL (ref 0.2–1.2)
BUN SERPL-MCNC: 20 MG/DL (ref 7–25)
CALCIUM SERPL-MCNC: 10.2 MG/DL (ref 8.6–10.3)
CHLORIDE SERPL-SCNC: 103 MMOL/L (ref 98–110)
CHOLEST SERPL-MCNC: 145 MG/DL
CHOLEST/HDLC SERPL: 3 (CALC)
CO2 SERPL-SCNC: 29 MMOL/L (ref 20–32)
CREAT SERPL-MCNC: 1.19 MG/DL (ref 0.7–1.28)
CREAT UR-MCNC: 64 MG/DL (ref 20–320)
EGFRCR SERPLBLD CKD-EPI 2021: 66 ML/MIN/1.73M2
EOSINOPHIL # BLD AUTO: 95 CELLS/UL (ref 15–500)
EOSINOPHIL NFR BLD AUTO: 0.8 %
ERYTHROCYTE [DISTWIDTH] IN BLOOD BY AUTOMATED COUNT: 13.2 % (ref 11–15)
GLUCOSE SERPL-MCNC: 92 MG/DL (ref 65–99)
HCT VFR BLD AUTO: 48.7 % (ref 38.5–50)
HDLC SERPL-MCNC: 48 MG/DL
HGB BLD-MCNC: 16.2 G/DL (ref 13.2–17.1)
LDLC SERPL CALC-MCNC: 76 MG/DL (CALC)
LYMPHOCYTES # BLD AUTO: 2475 CELLS/UL (ref 850–3900)
LYMPHOCYTES NFR BLD AUTO: 20.8 %
MCH RBC QN AUTO: 30.9 PG (ref 27–33)
MCHC RBC AUTO-ENTMCNC: 33.3 G/DL (ref 32–36)
MCV RBC AUTO: 92.9 FL (ref 80–100)
MICROALBUMIN UR-MCNC: 3.9 MG/DL
MONOCYTES # BLD AUTO: 666 CELLS/UL (ref 200–950)
MONOCYTES NFR BLD AUTO: 5.6 %
NEUTROPHILS # BLD AUTO: 8544 CELLS/UL (ref 1500–7800)
NEUTROPHILS NFR BLD AUTO: 71.8 %
NONHDLC SERPL-MCNC: 97 MG/DL (CALC)
PLATELET # BLD AUTO: 233 THOUSAND/UL (ref 140–400)
PMV BLD REES-ECKER: 12.6 FL (ref 7.5–12.5)
POTASSIUM SERPL-SCNC: 4.8 MMOL/L (ref 3.5–5.3)
PROT SERPL-MCNC: 7.3 G/DL (ref 6.1–8.1)
PSA SERPL-MCNC: 1.57 NG/ML
RBC # BLD AUTO: 5.24 MILLION/UL (ref 4.2–5.8)
SODIUM SERPL-SCNC: 141 MMOL/L (ref 135–146)
TRIGL SERPL-MCNC: 124 MG/DL
WBC # BLD AUTO: 11.9 THOUSAND/UL (ref 3.8–10.8)

## 2025-04-25 ENCOUNTER — TELEMEDICINE (OUTPATIENT)
Dept: VASCULAR SURGERY | Facility: HOSPITAL | Age: 70
End: 2025-04-25
Payer: COMMERCIAL

## 2025-04-25 DIAGNOSIS — I71.40 ABDOMINAL AORTIC ANEURYSM (AAA) WITHOUT RUPTURE, UNSPECIFIED PART: Primary | ICD-10-CM

## 2025-04-25 PROCEDURE — 99213 OFFICE O/P EST LOW 20 MIN: CPT | Performed by: NURSE PRACTITIONER

## 2025-04-25 PROCEDURE — 1123F ACP DISCUSS/DSCN MKR DOCD: CPT | Performed by: NURSE PRACTITIONER

## 2025-04-25 NOTE — PROGRESS NOTES
Vascular Surgery Clinic Note    Referring provider: No referring provider defined for this encounter.  Date of visit: 04/25/2025 10:30 AM EDT  Location of visit: [facility]    CC: FUV AAA    History Of Present Illness:   Porfirio Menendez is a 70 y.o. male being seen as virtual visit for routine follow up of his AAA that is being monitored for growth. He denies abdominal or back pain that may be attributed to his aneurysm.     Medical History:  Problem List[1]     SH:    Social Drivers of Health     Tobacco Use: High Risk (2/10/2025)    Patient History     Smoking Tobacco Use: Every Day     Smokeless Tobacco Use: Never     Passive Exposure: Not on file   Alcohol Use: Not on file   Financial Resource Strain: Not on file   Food Insecurity: Not on file   Transportation Needs: Not on file   Physical Activity: Not on file   Stress: Not on file   Social Connections: Not on file   Intimate Partner Violence: Not on file   Depression: Not at risk (8/9/2024)    PHQ-2     PHQ-2 Score: 0   Housing Stability: Not on file   Utilities: Not on file   Digital Equity: Not on file   Health Literacy: Not on file        FH:  Family History[2]     Allergies:   RX Allergies[3]    ROS:  All systems were reviewed and noted to be negative, other than described above.     Objective:  Last Recorded Vitals  There were no vitals taken for this visit.    Meds:   Current Outpatient Medications   Medication Instructions    latanoprost (Xalatan) 0.005 % ophthalmic solution 1 drop, Nightly    losartan (COZAAR) 100 mg, oral, Daily    pravastatin (PRAVACHOL) 40 mg, oral, Daily    timolol (Timoptic) 0.5 % ophthalmic solution 1 drop, Daily       Exam:  Constitutional: Well appearing, NAD   PSYCH: Appropriate mood and affect  MSK: DALAL x4  RESP: Unlabored breathing  NEURO: A&O x3    Imaging Reviewed:  Vascular US aorta iliac duplex complete 02/19/2025    [facility]  [address] [place] [address]  Phone  [phone]      Vascular Lab Report    [facility] US AORTA ILIAC DUPLEX COMPLETE    Patient Name:      STEPHANIE Torres Physician:  [id number] Julio C Solares MD  Study Date:        2/19/2025            Ordering Provider:  [id number] TASHI NYE  MRN/PID:           [id number]             Fellow:  Accession#:        [id number]         Technologist:       Adriane Huynh RVT  Date of Birth/Age: 1955 / 69 years Technologist 2:  Gender:            M                    Encounter#:         [id number]  Admission Status:  Outpatient           Location Performed: [facility]      Diagnosis/ICD: Abdominal aortic aneurysm, without rupture, unspecified-I71.40  CPT Codes:     59380 Duplex Aorta/IVC/Iliac/Bypass Graft      CONCLUSIONS:  Aorta/Common Iliac Arteries/IVC: A fusiform aneurysm is noted at the level of the infrarenal aorta.    Comparison:  Compared with study from 8/9/2024, no significant change.    Imaging & Doppler Findings:    AORTA     AP    Lateral    PSV  Proximal 2.37 cm 2.35 cm 86.2 cm/s  Mid    1.82 cm 1.80 cm 101.9 cm/s  Distal  4.41 cm 4.70 cm 85.5 cm/s    RIGHT       AP    Lateral    PSV  SUYAPA Proximal 1.09 cm 1.08 cm 98.50 cm/s    LEFT       AP    Lateral     PSV  SUYAPA Proximal 0.96 cm 0.97 cm 118.30 cm/s      [id number] Julio C Solares MD  Electronically signed by [id number] Solares MD on 2/19/2025 at 2:13:23 PM        ** Final **      Assessment & Plan:  1. Abdominal aortic aneurysm (AAA) without rupture, unspecified part  Vascular US aorta iliac duplex complete        AAA 4.4 x 4.7 cm (previously 3.9 x 4.8 cm).   Asymptomatic.   Blood pressure control  RTC in August with aortic duplex     Orders:  No orders of the defined types were placed in this encounter.    Salome Triana, APRN-CNP         [1]   Patient Active Problem List  Diagnosis    Symptomatic sinus bradycardia    Smoker    Shortness of breath    Palpitations    Nicotine dependence, cigarettes, uncomplicated    Mixed hyperlipidemia     Hypercholesteremia    Mild CAD    Lung nodule    Hypertension    Headache    Elevated blood pressure reading without diagnosis of hypertension    COVID-19    Carotid bruit    Benign essential HTN    Abnormal fasting glucose    AAA (abdominal aortic aneurysm)    Aortic valve disease   [2]   Family History  Problem Relation Name Age of Onset    Heart attack Mother      Other (issues with heart) Father     [3] No Known Allergies

## 2025-07-11 DIAGNOSIS — E78.2 MIXED HYPERLIPIDEMIA: ICD-10-CM

## 2025-07-11 DIAGNOSIS — I10 PRIMARY HYPERTENSION: ICD-10-CM

## 2025-08-11 ENCOUNTER — TELEPHONE (OUTPATIENT)
Dept: PRIMARY CARE | Facility: CLINIC | Age: 70
End: 2025-08-11

## 2025-08-11 ENCOUNTER — APPOINTMENT (OUTPATIENT)
Dept: PRIMARY CARE | Facility: CLINIC | Age: 70
End: 2025-08-11
Payer: COMMERCIAL

## 2025-08-11 VITALS
WEIGHT: 167 LBS | BODY MASS INDEX: 26.16 KG/M2 | OXYGEN SATURATION: 97 % | DIASTOLIC BLOOD PRESSURE: 68 MMHG | HEART RATE: 82 BPM | TEMPERATURE: 97.2 F | SYSTOLIC BLOOD PRESSURE: 132 MMHG

## 2025-08-11 DIAGNOSIS — Z12.2 SCREENING FOR LUNG CANCER: ICD-10-CM

## 2025-08-11 DIAGNOSIS — E78.2 MIXED HYPERLIPIDEMIA: ICD-10-CM

## 2025-08-11 DIAGNOSIS — F17.210 NICOTINE DEPENDENCE, CIGARETTES, UNCOMPLICATED: ICD-10-CM

## 2025-08-11 DIAGNOSIS — Z12.5 PROSTATE CANCER SCREENING: ICD-10-CM

## 2025-08-11 DIAGNOSIS — I10 PRIMARY HYPERTENSION: ICD-10-CM

## 2025-08-11 DIAGNOSIS — Z00.00 ANNUAL PHYSICAL EXAM: ICD-10-CM

## 2025-08-11 DIAGNOSIS — R01.1 SYSTOLIC MURMUR: ICD-10-CM

## 2025-08-11 DIAGNOSIS — I10 PRIMARY HYPERTENSION: Primary | ICD-10-CM

## 2025-08-11 PROCEDURE — 1160F RVW MEDS BY RX/DR IN RCRD: CPT

## 2025-08-11 PROCEDURE — 1159F MED LIST DOCD IN RCRD: CPT

## 2025-08-11 PROCEDURE — 99214 OFFICE O/P EST MOD 30 MIN: CPT

## 2025-08-11 PROCEDURE — 1126F AMNT PAIN NOTED NONE PRSNT: CPT

## 2025-08-11 PROCEDURE — 3078F DIAST BP <80 MM HG: CPT

## 2025-08-11 PROCEDURE — 3075F SYST BP GE 130 - 139MM HG: CPT

## 2025-08-11 RX ORDER — LOSARTAN POTASSIUM 100 MG/1
100 TABLET ORAL DAILY
Qty: 90 TABLET | Refills: 1 | Status: SHIPPED | OUTPATIENT
Start: 2025-08-11

## 2025-08-11 RX ORDER — PRAVASTATIN SODIUM 40 MG/1
40 TABLET ORAL DAILY
Qty: 90 TABLET | Refills: 1 | Status: SHIPPED | OUTPATIENT
Start: 2025-08-11

## 2025-08-11 ASSESSMENT — ENCOUNTER SYMPTOMS
DIARRHEA: 0
DIZZINESS: 0
WEAKNESS: 0
VOMITING: 0
ACTIVITY CHANGE: 0
CHEST TIGHTNESS: 0
ARTHRALGIAS: 0
NAUSEA: 0
CONSTIPATION: 0
LIGHT-HEADEDNESS: 0
PALPITATIONS: 0
DIFFICULTY URINATING: 0
UNEXPECTED WEIGHT CHANGE: 0
MYALGIAS: 0
APPETITE CHANGE: 0
SHORTNESS OF BREATH: 0
HEMATURIA: 0
FATIGUE: 0
TREMORS: 0
HEADACHES: 0
COLOR CHANGE: 0

## 2025-08-11 ASSESSMENT — PATIENT HEALTH QUESTIONNAIRE - PHQ9
1. LITTLE INTEREST OR PLEASURE IN DOING THINGS: NOT AT ALL
2. FEELING DOWN, DEPRESSED OR HOPELESS: NOT AT ALL
SUM OF ALL RESPONSES TO PHQ9 QUESTIONS 1 AND 2: 0

## 2025-08-11 ASSESSMENT — PAIN SCALES - GENERAL: PAINLEVEL_OUTOF10: 0-NO PAIN

## 2026-02-11 ENCOUNTER — APPOINTMENT (OUTPATIENT)
Dept: PRIMARY CARE | Facility: CLINIC | Age: 71
End: 2026-02-11
Payer: COMMERCIAL